# Patient Record
Sex: FEMALE | URBAN - METROPOLITAN AREA
[De-identification: names, ages, dates, MRNs, and addresses within clinical notes are randomized per-mention and may not be internally consistent; named-entity substitution may affect disease eponyms.]

---

## 2018-11-10 ENCOUNTER — INPATIENT (INPATIENT)
Facility: HOSPITAL | Age: 83
LOS: 0 days | DRG: 871 | End: 2018-11-11
Attending: INTERNAL MEDICINE | Admitting: INTERNAL MEDICINE
Payer: MEDICARE

## 2018-11-10 VITALS
DIASTOLIC BLOOD PRESSURE: 60 MMHG | TEMPERATURE: 95 F | HEIGHT: 63 IN | SYSTOLIC BLOOD PRESSURE: 151 MMHG | OXYGEN SATURATION: 100 % | HEART RATE: 68 BPM | WEIGHT: 85.1 LBS | RESPIRATION RATE: 16 BRPM

## 2018-11-10 DIAGNOSIS — Z90.11 ACQUIRED ABSENCE OF RIGHT BREAST AND NIPPLE: Chronic | ICD-10-CM

## 2018-11-10 DIAGNOSIS — I10 ESSENTIAL (PRIMARY) HYPERTENSION: ICD-10-CM

## 2018-11-10 DIAGNOSIS — G25.3 MYOCLONUS: ICD-10-CM

## 2018-11-10 DIAGNOSIS — Z29.9 ENCOUNTER FOR PROPHYLACTIC MEASURES, UNSPECIFIED: ICD-10-CM

## 2018-11-10 DIAGNOSIS — C50.919 MALIGNANT NEOPLASM OF UNSPECIFIED SITE OF UNSPECIFIED FEMALE BREAST: ICD-10-CM

## 2018-11-10 DIAGNOSIS — C90.00 MULTIPLE MYELOMA NOT HAVING ACHIEVED REMISSION: ICD-10-CM

## 2018-11-10 DIAGNOSIS — N17.9 ACUTE KIDNEY FAILURE, UNSPECIFIED: ICD-10-CM

## 2018-11-10 DIAGNOSIS — A41.9 SEPSIS, UNSPECIFIED ORGANISM: ICD-10-CM

## 2018-11-10 DIAGNOSIS — I46.9 CARDIAC ARREST, CAUSE UNSPECIFIED: ICD-10-CM

## 2018-11-10 DIAGNOSIS — K72.00 ACUTE AND SUBACUTE HEPATIC FAILURE WITHOUT COMA: ICD-10-CM

## 2018-11-10 DIAGNOSIS — D64.9 ANEMIA, UNSPECIFIED: ICD-10-CM

## 2018-11-10 LAB
ABO RH CONFIRMATION: SIGNIFICANT CHANGE UP
ALBUMIN SERPL ELPH-MCNC: 1.4 G/DL — LOW (ref 3.5–5)
ALBUMIN SERPL ELPH-MCNC: 1.5 G/DL — LOW (ref 3.5–5)
ALBUMIN SERPL ELPH-MCNC: 1.6 G/DL — LOW (ref 3.5–5)
ALP SERPL-CCNC: 107 U/L — SIGNIFICANT CHANGE UP (ref 40–120)
ALP SERPL-CCNC: 109 U/L — SIGNIFICANT CHANGE UP (ref 40–120)
ALP SERPL-CCNC: 99 U/L — SIGNIFICANT CHANGE UP (ref 40–120)
ALT FLD-CCNC: 1445 U/L DA — HIGH (ref 10–60)
ALT FLD-CCNC: 446 U/L DA — HIGH (ref 10–60)
ALT FLD-CCNC: 999 U/L DA — HIGH (ref 10–60)
ANION GAP SERPL CALC-SCNC: 21 MMOL/L — HIGH (ref 5–17)
ANION GAP SERPL CALC-SCNC: 23 MMOL/L — HIGH (ref 5–17)
ANION GAP SERPL CALC-SCNC: 25 MMOL/L — HIGH (ref 5–17)
APPEARANCE UR: CLEAR — SIGNIFICANT CHANGE UP
APTT BLD: 56 SEC — HIGH (ref 27.5–36.3)
AST SERPL-CCNC: 2806 U/L — HIGH (ref 10–40)
AST SERPL-CCNC: 725 U/L — HIGH (ref 10–40)
AST SERPL-CCNC: >2002 U/L — HIGH (ref 10–40)
BASE EXCESS BLDA CALC-SCNC: -12.7 MMOL/L — LOW (ref -2–2)
BASE EXCESS BLDA CALC-SCNC: -8.5 MMOL/L — LOW (ref -2–2)
BASE EXCESS BLDV CALC-SCNC: -9.8 MMOL/L — LOW (ref -2–2)
BILIRUB SERPL-MCNC: 0.7 MG/DL — SIGNIFICANT CHANGE UP (ref 0.2–1.2)
BILIRUB SERPL-MCNC: 1.3 MG/DL — HIGH (ref 0.2–1.2)
BILIRUB SERPL-MCNC: 1.4 MG/DL — HIGH (ref 0.2–1.2)
BILIRUB UR-MCNC: NEGATIVE — SIGNIFICANT CHANGE UP
BLOOD GAS COMMENTS ARTERIAL: SIGNIFICANT CHANGE UP
BLOOD GAS COMMENTS ARTERIAL: SIGNIFICANT CHANGE UP
BUN SERPL-MCNC: 43 MG/DL — HIGH (ref 7–18)
BUN SERPL-MCNC: 44 MG/DL — HIGH (ref 7–18)
BUN SERPL-MCNC: 45 MG/DL — HIGH (ref 7–18)
CALCIUM SERPL-MCNC: 6.4 MG/DL — CRITICAL LOW (ref 8.4–10.5)
CALCIUM SERPL-MCNC: 6.7 MG/DL — LOW (ref 8.4–10.5)
CALCIUM SERPL-MCNC: 7.1 MG/DL — LOW (ref 8.4–10.5)
CHLORIDE SERPL-SCNC: 102 MMOL/L — SIGNIFICANT CHANGE UP (ref 96–108)
CHLORIDE SERPL-SCNC: 102 MMOL/L — SIGNIFICANT CHANGE UP (ref 96–108)
CHLORIDE SERPL-SCNC: 99 MMOL/L — SIGNIFICANT CHANGE UP (ref 96–108)
CHLORIDE UR-SCNC: 104 MMOL/L — SIGNIFICANT CHANGE UP (ref 55–125)
CHOLEST SERPL-MCNC: 82 MG/DL — SIGNIFICANT CHANGE UP (ref 10–199)
CK MB BLD-MCNC: 0.7 % — SIGNIFICANT CHANGE UP (ref 0–3.5)
CK MB CFR SERPL CALC: 16.1 NG/ML — HIGH (ref 0–3.6)
CK MB CFR SERPL CALC: 2.1 NG/ML — SIGNIFICANT CHANGE UP (ref 0–3.6)
CK MB CFR SERPL CALC: 25 NG/ML — HIGH (ref 0–3.6)
CK SERPL-CCNC: 2268 U/L — SIGNIFICANT CHANGE UP (ref 21–215)
CK SERPL-CCNC: 3449 U/L — HIGH (ref 21–215)
CO2 SERPL-SCNC: 12 MMOL/L — LOW (ref 22–31)
CO2 SERPL-SCNC: 12 MMOL/L — LOW (ref 22–31)
CO2 SERPL-SCNC: 16 MMOL/L — LOW (ref 22–31)
COLOR SPEC: YELLOW — SIGNIFICANT CHANGE UP
CREAT ?TM UR-MCNC: <13 MG/DL — SIGNIFICANT CHANGE UP
CREAT SERPL-MCNC: 1.86 MG/DL — HIGH (ref 0.5–1.3)
CREAT SERPL-MCNC: 1.92 MG/DL — HIGH (ref 0.5–1.3)
CREAT SERPL-MCNC: 2.09 MG/DL — HIGH (ref 0.5–1.3)
DIFF PNL FLD: ABNORMAL
GLUCOSE SERPL-MCNC: 204 MG/DL — HIGH (ref 70–99)
GLUCOSE SERPL-MCNC: 225 MG/DL — HIGH (ref 70–99)
GLUCOSE SERPL-MCNC: 315 MG/DL — HIGH (ref 70–99)
GLUCOSE UR QL: NEGATIVE — SIGNIFICANT CHANGE UP
HBA1C BLD-MCNC: 4.8 % — SIGNIFICANT CHANGE UP (ref 4–5.6)
HCO3 BLDA-SCNC: 12 MMOL/L — LOW (ref 23–27)
HCO3 BLDA-SCNC: 15 MMOL/L — LOW (ref 23–27)
HCO3 BLDV-SCNC: 16 MMOL/L — LOW (ref 21–29)
HCT VFR BLD CALC: 20.6 % — CRITICAL LOW (ref 34.5–45)
HCT VFR BLD CALC: 28.8 % — LOW (ref 34.5–45)
HCT VFR BLD CALC: 33 % — LOW (ref 34.5–45)
HDLC SERPL-MCNC: 38 MG/DL — LOW
HGB BLD-MCNC: 10.1 G/DL — LOW (ref 11.5–15.5)
HGB BLD-MCNC: 11.7 G/DL — SIGNIFICANT CHANGE UP (ref 11.5–15.5)
HGB BLD-MCNC: 6.2 G/DL — CRITICAL LOW (ref 11.5–15.5)
HOROWITZ INDEX BLDA+IHG-RTO: 100 — SIGNIFICANT CHANGE UP
HOROWITZ INDEX BLDA+IHG-RTO: 40 — SIGNIFICANT CHANGE UP
HOROWITZ INDEX BLDV+IHG-RTO: 40 — SIGNIFICANT CHANGE UP
INR BLD: 2.24 RATIO — HIGH (ref 0.88–1.16)
KETONES UR-MCNC: NEGATIVE — SIGNIFICANT CHANGE UP
LACTATE SERPL-SCNC: 12.5 MMOL/L — CRITICAL HIGH (ref 0.7–2)
LACTATE SERPL-SCNC: 13.7 MMOL/L — CRITICAL HIGH (ref 0.7–2)
LACTATE SERPL-SCNC: 14.5 MMOL/L — CRITICAL HIGH (ref 0.7–2)
LACTATE SERPL-SCNC: 14.8 MMOL/L — CRITICAL HIGH (ref 0.7–2)
LACTATE SERPL-SCNC: 16.3 MMOL/L — CRITICAL HIGH (ref 0.7–2)
LEUKOCYTE ESTERASE UR-ACNC: ABNORMAL
LIDOCAIN IGE QN: 89 U/L — SIGNIFICANT CHANGE UP (ref 73–393)
LIPID PNL WITH DIRECT LDL SERPL: 36 MG/DL — SIGNIFICANT CHANGE UP
LYMPHOCYTES # BLD AUTO: 67 % — HIGH (ref 13–44)
MCHC RBC-ENTMCNC: 30 GM/DL — LOW (ref 32–36)
MCHC RBC-ENTMCNC: 30.5 PG — SIGNIFICANT CHANGE UP (ref 27–34)
MCHC RBC-ENTMCNC: 31 PG — SIGNIFICANT CHANGE UP (ref 27–34)
MCHC RBC-ENTMCNC: 31 PG — SIGNIFICANT CHANGE UP (ref 27–34)
MCHC RBC-ENTMCNC: 35.1 GM/DL — SIGNIFICANT CHANGE UP (ref 32–36)
MCHC RBC-ENTMCNC: 35.3 GM/DL — SIGNIFICANT CHANGE UP (ref 32–36)
MCV RBC AUTO: 103.1 FL — HIGH (ref 80–100)
MCV RBC AUTO: 87 FL — SIGNIFICANT CHANGE UP (ref 80–100)
MCV RBC AUTO: 87.7 FL — SIGNIFICANT CHANGE UP (ref 80–100)
MONOCYTES NFR BLD AUTO: 1 % — LOW (ref 2–14)
NEUTROPHILS NFR BLD AUTO: 19 % — LOW (ref 43–77)
NITRITE UR-MCNC: NEGATIVE — SIGNIFICANT CHANGE UP
NT-PROBNP SERPL-SCNC: 9532 PG/ML — HIGH (ref 0–450)
PCO2 BLDA: 24 MMHG — LOW (ref 32–46)
PCO2 BLDA: 26 MMHG — LOW (ref 32–46)
PCO2 BLDV: 36 MMHG — SIGNIFICANT CHANGE UP (ref 35–50)
PH BLDA: 7.32 — LOW (ref 7.35–7.45)
PH BLDA: 7.39 — SIGNIFICANT CHANGE UP (ref 7.35–7.45)
PH BLDV: 7.27 — LOW (ref 7.35–7.45)
PH UR: 6.5 — SIGNIFICANT CHANGE UP (ref 5–8)
PLATELET # BLD AUTO: 104 K/UL — LOW (ref 150–400)
PLATELET # BLD AUTO: 63 K/UL — LOW (ref 150–400)
PLATELET # BLD AUTO: 96 K/UL — LOW (ref 150–400)
PO2 BLDA: 516 MMHG — HIGH (ref 74–108)
PO2 BLDA: 81 MMHG — SIGNIFICANT CHANGE UP (ref 74–108)
PO2 BLDV: SIGNIFICANT CHANGE UP MMHG (ref 25–45)
POTASSIUM SERPL-MCNC: 3.4 MMOL/L — LOW (ref 3.5–5.3)
POTASSIUM SERPL-MCNC: 4 MMOL/L — SIGNIFICANT CHANGE UP (ref 3.5–5.3)
POTASSIUM SERPL-MCNC: 5.6 MMOL/L — HIGH (ref 3.5–5.3)
POTASSIUM SERPL-SCNC: 3.4 MMOL/L — LOW (ref 3.5–5.3)
POTASSIUM SERPL-SCNC: 4 MMOL/L — SIGNIFICANT CHANGE UP (ref 3.5–5.3)
POTASSIUM SERPL-SCNC: 5.6 MMOL/L — HIGH (ref 3.5–5.3)
PROT ?TM UR-MCNC: 69 MG/DL — HIGH (ref 0–12)
PROT SERPL-MCNC: 5.1 G/DL — LOW (ref 6–8.3)
PROT SERPL-MCNC: 5.3 G/DL — LOW (ref 6–8.3)
PROT SERPL-MCNC: 5.6 G/DL — LOW (ref 6–8.3)
PROT UR-MCNC: 30 MG/DL
PROTHROM AB SERPL-ACNC: 25.5 SEC — HIGH (ref 10–12.9)
RBC # BLD: 2 M/UL — LOW (ref 3.8–5.2)
RBC # BLD: 3.31 M/UL — LOW (ref 3.8–5.2)
RBC # BLD: 3.76 M/UL — LOW (ref 3.8–5.2)
RBC # FLD: 16.4 % — HIGH (ref 10.3–14.5)
RBC # FLD: 17.3 % — HIGH (ref 10.3–14.5)
RBC # FLD: 17.4 % — HIGH (ref 10.3–14.5)
SAO2 % BLDA: 95 % — SIGNIFICANT CHANGE UP (ref 92–96)
SAO2 % BLDA: SIGNIFICANT CHANGE UP % (ref 92–96)
SAO2 % BLDV: 60 % — LOW (ref 67–88)
SODIUM SERPL-SCNC: 134 MMOL/L — LOW (ref 135–145)
SODIUM SERPL-SCNC: 139 MMOL/L — SIGNIFICANT CHANGE UP (ref 135–145)
SODIUM SERPL-SCNC: 139 MMOL/L — SIGNIFICANT CHANGE UP (ref 135–145)
SODIUM UR-SCNC: 110 MMOL/L — SIGNIFICANT CHANGE UP (ref 40–220)
SP GR SPEC: 1.01 — SIGNIFICANT CHANGE UP (ref 1.01–1.02)
TOTAL CHOLESTEROL/HDL RATIO MEASUREMENT: 2.2 RATIO — LOW (ref 3.3–7.1)
TRIGL SERPL-MCNC: 39 MG/DL — SIGNIFICANT CHANGE UP (ref 10–149)
TROPONIN I SERPL-MCNC: 0.14 NG/ML — HIGH (ref 0–0.04)
TROPONIN I SERPL-MCNC: 0.93 NG/ML — HIGH (ref 0–0.04)
TROPONIN I SERPL-MCNC: 2.86 NG/ML — HIGH (ref 0–0.04)
TSH SERPL-MCNC: 7 UU/ML — HIGH (ref 0.34–4.82)
UROBILINOGEN FLD QL: NEGATIVE — SIGNIFICANT CHANGE UP
WBC # BLD: 0.5 K/UL — CRITICAL LOW (ref 3.8–10.5)
WBC # BLD: 0.6 K/UL — CRITICAL LOW (ref 3.8–10.5)
WBC # BLD: 3 K/UL — LOW (ref 3.8–10.5)
WBC # FLD AUTO: 0.5 K/UL — CRITICAL LOW (ref 3.8–10.5)
WBC # FLD AUTO: 0.6 K/UL — CRITICAL LOW (ref 3.8–10.5)
WBC # FLD AUTO: 3 K/UL — LOW (ref 3.8–10.5)

## 2018-11-10 PROCEDURE — 71045 X-RAY EXAM CHEST 1 VIEW: CPT | Mod: 26,77

## 2018-11-10 PROCEDURE — 99291 CRITICAL CARE FIRST HOUR: CPT

## 2018-11-10 PROCEDURE — 70450 CT HEAD/BRAIN W/O DYE: CPT | Mod: 26

## 2018-11-10 PROCEDURE — 93970 EXTREMITY STUDY: CPT | Mod: 26

## 2018-11-10 PROCEDURE — 95819 EEG AWAKE AND ASLEEP: CPT | Mod: 26

## 2018-11-10 PROCEDURE — 74018 RADEX ABDOMEN 1 VIEW: CPT | Mod: 26,76

## 2018-11-10 RX ORDER — ATROPINE SULFATE 0.1 MG/ML
0.5 SYRINGE (ML) INJECTION ONCE
Qty: 0 | Refills: 0 | Status: COMPLETED | OUTPATIENT
Start: 2018-11-10 | End: 2018-11-10

## 2018-11-10 RX ORDER — ACETYLCYSTEINE 200 MG/ML
3.9 VIAL (ML) MISCELLANEOUS ONCE
Qty: 0 | Refills: 0 | Status: COMPLETED | OUTPATIENT
Start: 2018-11-10 | End: 2018-11-10

## 2018-11-10 RX ORDER — ATORVASTATIN CALCIUM 80 MG/1
40 TABLET, FILM COATED ORAL AT BEDTIME
Qty: 0 | Refills: 0 | Status: DISCONTINUED | OUTPATIENT
Start: 2018-11-10 | End: 2018-11-11

## 2018-11-10 RX ORDER — PANTOPRAZOLE SODIUM 20 MG/1
80 TABLET, DELAYED RELEASE ORAL ONCE
Qty: 0 | Refills: 0 | Status: DISCONTINUED | OUTPATIENT
Start: 2018-11-10 | End: 2018-11-10

## 2018-11-10 RX ORDER — FAMOTIDINE 10 MG/ML
20 INJECTION INTRAVENOUS
Qty: 0 | Refills: 0 | Status: DISCONTINUED | OUTPATIENT
Start: 2018-11-10 | End: 2018-11-10

## 2018-11-10 RX ORDER — ACETYLCYSTEINE 200 MG/ML
1.9 VIAL (ML) MISCELLANEOUS ONCE
Qty: 0 | Refills: 0 | Status: COMPLETED | OUTPATIENT
Start: 2018-11-10 | End: 2018-11-10

## 2018-11-10 RX ORDER — CALCIUM GLUCONATE 100 MG/ML
1 VIAL (ML) INTRAVENOUS ONCE
Qty: 0 | Refills: 0 | Status: COMPLETED | OUTPATIENT
Start: 2018-11-10 | End: 2018-11-10

## 2018-11-10 RX ORDER — PROPOFOL 10 MG/ML
5 INJECTION, EMULSION INTRAVENOUS
Qty: 1000 | Refills: 0 | Status: DISCONTINUED | OUTPATIENT
Start: 2018-11-10 | End: 2018-11-10

## 2018-11-10 RX ORDER — SODIUM CHLORIDE 9 MG/ML
1000 INJECTION, SOLUTION INTRAVENOUS
Qty: 0 | Refills: 0 | Status: DISCONTINUED | OUTPATIENT
Start: 2018-11-10 | End: 2018-11-11

## 2018-11-10 RX ORDER — INSULIN LISPRO 100/ML
VIAL (ML) SUBCUTANEOUS EVERY 6 HOURS
Qty: 0 | Refills: 0 | Status: DISCONTINUED | OUTPATIENT
Start: 2018-11-10 | End: 2018-11-11

## 2018-11-10 RX ORDER — PANTOPRAZOLE SODIUM 20 MG/1
80 TABLET, DELAYED RELEASE ORAL ONCE
Qty: 0 | Refills: 0 | Status: COMPLETED | OUTPATIENT
Start: 2018-11-10 | End: 2018-11-10

## 2018-11-10 RX ORDER — ASPIRIN/CALCIUM CARB/MAGNESIUM 324 MG
300 TABLET ORAL DAILY
Qty: 0 | Refills: 0 | Status: DISCONTINUED | OUTPATIENT
Start: 2018-11-10 | End: 2018-11-11

## 2018-11-10 RX ORDER — SODIUM CHLORIDE 9 MG/ML
500 INJECTION INTRAMUSCULAR; INTRAVENOUS; SUBCUTANEOUS ONCE
Qty: 0 | Refills: 0 | Status: COMPLETED | OUTPATIENT
Start: 2018-11-10 | End: 2018-11-10

## 2018-11-10 RX ORDER — VANCOMYCIN HCL 1 G
750 VIAL (EA) INTRAVENOUS EVERY 24 HOURS
Qty: 0 | Refills: 0 | Status: DISCONTINUED | OUTPATIENT
Start: 2018-11-10 | End: 2018-11-11

## 2018-11-10 RX ORDER — DEXTROSE 50 % IN WATER 50 %
15 SYRINGE (ML) INTRAVENOUS ONCE
Qty: 0 | Refills: 0 | Status: DISCONTINUED | OUTPATIENT
Start: 2018-11-10 | End: 2018-11-10

## 2018-11-10 RX ORDER — SODIUM CHLORIDE 9 MG/ML
2000 INJECTION INTRAMUSCULAR; INTRAVENOUS; SUBCUTANEOUS ONCE
Qty: 0 | Refills: 0 | Status: COMPLETED | OUTPATIENT
Start: 2018-11-10 | End: 2018-11-10

## 2018-11-10 RX ORDER — VANCOMYCIN HCL 1 G
1000 VIAL (EA) INTRAVENOUS ONCE
Qty: 0 | Refills: 0 | Status: COMPLETED | OUTPATIENT
Start: 2018-11-10 | End: 2018-11-10

## 2018-11-10 RX ORDER — SODIUM CHLORIDE 9 MG/ML
1000 INJECTION, SOLUTION INTRAVENOUS
Qty: 0 | Refills: 0 | Status: DISCONTINUED | OUTPATIENT
Start: 2018-11-10 | End: 2018-11-10

## 2018-11-10 RX ORDER — CHLORHEXIDINE GLUCONATE 213 G/1000ML
1 SOLUTION TOPICAL DAILY
Qty: 0 | Refills: 0 | Status: DISCONTINUED | OUTPATIENT
Start: 2018-11-10 | End: 2018-11-10

## 2018-11-10 RX ORDER — ACETYLCYSTEINE 200 MG/ML
6 VIAL (ML) MISCELLANEOUS ONCE
Qty: 0 | Refills: 0 | Status: COMPLETED | OUTPATIENT
Start: 2018-11-10 | End: 2018-11-10

## 2018-11-10 RX ORDER — FILGRASTIM 480MCG/1.6
300 VIAL (ML) INJECTION DAILY
Qty: 0 | Refills: 0 | Status: DISCONTINUED | OUTPATIENT
Start: 2018-11-10 | End: 2018-11-11

## 2018-11-10 RX ORDER — NOREPINEPHRINE BITARTRATE/D5W 8 MG/250ML
0.05 PLASTIC BAG, INJECTION (ML) INTRAVENOUS
Qty: 16 | Refills: 0 | Status: DISCONTINUED | OUTPATIENT
Start: 2018-11-10 | End: 2018-11-11

## 2018-11-10 RX ORDER — SODIUM CHLORIDE 9 MG/ML
1000 INJECTION INTRAMUSCULAR; INTRAVENOUS; SUBCUTANEOUS
Qty: 0 | Refills: 0 | Status: DISCONTINUED | OUTPATIENT
Start: 2018-11-10 | End: 2018-11-10

## 2018-11-10 RX ORDER — DEXTROSE 50 % IN WATER 50 %
12.5 SYRINGE (ML) INTRAVENOUS ONCE
Qty: 0 | Refills: 0 | Status: DISCONTINUED | OUTPATIENT
Start: 2018-11-10 | End: 2018-11-10

## 2018-11-10 RX ORDER — SODIUM CHLORIDE 9 MG/ML
1000 INJECTION INTRAMUSCULAR; INTRAVENOUS; SUBCUTANEOUS ONCE
Qty: 0 | Refills: 0 | Status: COMPLETED | OUTPATIENT
Start: 2018-11-10 | End: 2018-11-10

## 2018-11-10 RX ORDER — PANTOPRAZOLE SODIUM 20 MG/1
40 TABLET, DELAYED RELEASE ORAL
Qty: 0 | Refills: 0 | Status: DISCONTINUED | OUTPATIENT
Start: 2018-11-10 | End: 2018-11-10

## 2018-11-10 RX ORDER — PANTOPRAZOLE SODIUM 20 MG/1
40 TABLET, DELAYED RELEASE ORAL
Qty: 0 | Refills: 0 | Status: DISCONTINUED | OUTPATIENT
Start: 2018-11-10 | End: 2018-11-11

## 2018-11-10 RX ORDER — DEXTROSE 50 % IN WATER 50 %
25 SYRINGE (ML) INTRAVENOUS ONCE
Qty: 0 | Refills: 0 | Status: DISCONTINUED | OUTPATIENT
Start: 2018-11-10 | End: 2018-11-10

## 2018-11-10 RX ORDER — PIPERACILLIN AND TAZOBACTAM 4; .5 G/20ML; G/20ML
3.38 INJECTION, POWDER, LYOPHILIZED, FOR SOLUTION INTRAVENOUS EVERY 12 HOURS
Qty: 0 | Refills: 0 | Status: DISCONTINUED | OUTPATIENT
Start: 2018-11-10 | End: 2018-11-11

## 2018-11-10 RX ORDER — LEVETIRACETAM 250 MG/1
1000 TABLET, FILM COATED ORAL EVERY 12 HOURS
Qty: 0 | Refills: 0 | Status: DISCONTINUED | OUTPATIENT
Start: 2018-11-10 | End: 2018-11-11

## 2018-11-10 RX ORDER — SODIUM BICARBONATE 1 MEQ/ML
150 SYRINGE (ML) INTRAVENOUS ONCE
Qty: 0 | Refills: 0 | Status: COMPLETED | OUTPATIENT
Start: 2018-11-10 | End: 2018-11-10

## 2018-11-10 RX ORDER — DEXTROSE 50 % IN WATER 50 %
25 SYRINGE (ML) INTRAVENOUS ONCE
Qty: 0 | Refills: 0 | Status: COMPLETED | OUTPATIENT
Start: 2018-11-10 | End: 2018-11-10

## 2018-11-10 RX ORDER — GLUCAGON INJECTION, SOLUTION 0.5 MG/.1ML
1 INJECTION, SOLUTION SUBCUTANEOUS ONCE
Qty: 0 | Refills: 0 | Status: DISCONTINUED | OUTPATIENT
Start: 2018-11-10 | End: 2018-11-11

## 2018-11-10 RX ORDER — DEXTROSE 50 % IN WATER 50 %
12.5 SYRINGE (ML) INTRAVENOUS ONCE
Qty: 0 | Refills: 0 | Status: COMPLETED | OUTPATIENT
Start: 2018-11-10 | End: 2018-11-10

## 2018-11-10 RX ORDER — CHLORHEXIDINE GLUCONATE 213 G/1000ML
1 SOLUTION TOPICAL
Qty: 0 | Refills: 0 | Status: DISCONTINUED | OUTPATIENT
Start: 2018-11-10 | End: 2018-11-11

## 2018-11-10 RX ORDER — PIPERACILLIN AND TAZOBACTAM 4; .5 G/20ML; G/20ML
3.38 INJECTION, POWDER, LYOPHILIZED, FOR SOLUTION INTRAVENOUS ONCE
Qty: 0 | Refills: 0 | Status: COMPLETED | OUTPATIENT
Start: 2018-11-10 | End: 2018-11-10

## 2018-11-10 RX ADMIN — Medication 63.72 GRAM(S): at 20:41

## 2018-11-10 RX ADMIN — Medication 150 MILLIEQUIVALENT(S): at 08:53

## 2018-11-10 RX ADMIN — Medication 200 GRAM(S): at 08:53

## 2018-11-10 RX ADMIN — Medication 0.5 MILLIGRAM(S): at 20:07

## 2018-11-10 RX ADMIN — Medication 250 MILLIGRAM(S): at 20:41

## 2018-11-10 RX ADMIN — PIPERACILLIN AND TAZOBACTAM 25 GRAM(S): 4; .5 INJECTION, POWDER, LYOPHILIZED, FOR SOLUTION INTRAVENOUS at 23:46

## 2018-11-10 RX ADMIN — Medication 12.5 GRAM(S): at 13:40

## 2018-11-10 RX ADMIN — Medication 1.81 MICROGRAM(S)/KG/MIN: at 16:07

## 2018-11-10 RX ADMIN — PANTOPRAZOLE SODIUM 80 MILLIGRAM(S): 20 TABLET, DELAYED RELEASE ORAL at 13:25

## 2018-11-10 RX ADMIN — SODIUM CHLORIDE 2000 MILLILITER(S): 9 INJECTION INTRAMUSCULAR; INTRAVENOUS; SUBCUTANEOUS at 08:15

## 2018-11-10 RX ADMIN — SODIUM CHLORIDE 2000 MILLILITER(S): 9 INJECTION INTRAMUSCULAR; INTRAVENOUS; SUBCUTANEOUS at 07:38

## 2018-11-10 RX ADMIN — SODIUM CHLORIDE 100 MILLILITER(S): 9 INJECTION, SOLUTION INTRAVENOUS at 13:43

## 2018-11-10 RX ADMIN — PIPERACILLIN AND TAZOBACTAM 200 GRAM(S): 4; .5 INJECTION, POWDER, LYOPHILIZED, FOR SOLUTION INTRAVENOUS at 13:38

## 2018-11-10 RX ADMIN — SODIUM CHLORIDE 75 MILLILITER(S): 9 INJECTION, SOLUTION INTRAVENOUS at 16:08

## 2018-11-10 RX ADMIN — LEVETIRACETAM 400 MILLIGRAM(S): 250 TABLET, FILM COATED ORAL at 20:49

## 2018-11-10 RX ADMIN — Medication 300 MICROGRAM(S): at 18:43

## 2018-11-10 RX ADMIN — SODIUM CHLORIDE 75 MILLILITER(S): 9 INJECTION INTRAMUSCULAR; INTRAVENOUS; SUBCUTANEOUS at 10:34

## 2018-11-10 RX ADMIN — Medication 200 GRAM(S): at 17:39

## 2018-11-10 RX ADMIN — SODIUM CHLORIDE 500 MILLILITER(S): 9 INJECTION INTRAMUSCULAR; INTRAVENOUS; SUBCUTANEOUS at 21:32

## 2018-11-10 RX ADMIN — Medication 300 MILLIGRAM(S): at 17:41

## 2018-11-10 RX ADMIN — Medication 127.38 GRAM(S): at 16:07

## 2018-11-10 RX ADMIN — LEVETIRACETAM 400 MILLIGRAM(S): 250 TABLET, FILM COATED ORAL at 14:00

## 2018-11-10 RX ADMIN — CHLORHEXIDINE GLUCONATE 1 APPLICATION(S): 213 SOLUTION TOPICAL at 18:43

## 2018-11-10 RX ADMIN — Medication 3: at 17:40

## 2018-11-10 RX ADMIN — Medication 250 MILLIGRAM(S): at 10:30

## 2018-11-10 RX ADMIN — Medication 280 GRAM(S): at 15:00

## 2018-11-10 RX ADMIN — Medication 1 APPLICATION(S): at 20:49

## 2018-11-10 RX ADMIN — Medication 25 GRAM(S): at 13:41

## 2018-11-10 RX ADMIN — SODIUM CHLORIDE 1000 MILLILITER(S): 9 INJECTION INTRAMUSCULAR; INTRAVENOUS; SUBCUTANEOUS at 08:54

## 2018-11-10 NOTE — H&P ADULT - PROBLEM SELECTOR PLAN 5
baseline LFTS unknown  shock liver  trend CMP with fluids  f/u hepatitis panel  trend lactate and hydrate

## 2018-11-10 NOTE — H&P ADULT - HISTORY OF PRESENT ILLNESS
PULMONARY/CRITICAL CARE CONSULTATION  JYOTI ADAMS 89y FemalePatient is a 89y old  Female who presents with a chief complaint of     HPI:     PAST MEDICAL & SURGICAL HISTORY:  Hypertension  Breast cancer  Multiple myeloma  H/O right mastectomy    Allergies    No Known Allergies    Intolerances      SOCIAL HISTORY/FAMILY HISTORY reviewed:   Medications:  aspirin Suppository 300 milliGRAM(s) Rectal daily  atorvastatin 40 milliGRAM(s) Oral at bedtime  atropine Injectable 0.5 milliGRAM(s) IV Push once PRN  calcium gluconate IVPB 1 Gram(s) IV Intermittent Once  piperacillin/tazobactam IVPB. 3.375 Gram(s) IV Intermittent once  sodium bicarbonate  Injectable 150 milliEquivalent(s) IV Push Once  sodium chloride 0.9% Bolus 1000 milliLiter(s) IV Bolus once  sodium chloride 0.9%. 1000 milliLiter(s) IV Continuous <Continuous>  vancomycin  IVPB 1000 milliGRAM(s) IV Intermittent once      Review of Systems: unobtainable 2/2 mental status, and intubation    vent settings if applicable:  Mode: AC/ CMV (Assist Control/ Continuous Mandatory Ventilation)  RR (machine): 16  TV (machine): 450  FiO2: 100  PEEP: 5  MAP: 10  PIP: 26      T(F): 94.8 (11-10-18 @ 06:54), Max: 94.8 (11-10-18 @ 06:54)  HR: 64 (11-10-18 @ 07:54)  BP: 117/41 (11-10-18 @ 07:54)  BP(mean): --  ABP: --  RR: 15 (11-10-18 @ 07:54)  SpO2: 100% (11-10-18 @ 07:54)      LABS:                        6.2    3.0   )-----------( x        ( 10 Nov 2018 07:30 )             20.6     11-10    134<L>  |  99  |  44<H>  ----------------------------<  204<H>  5.6<H>   |  12<L>  |  2.09<H>    Ca    7.1<L>      10 Nov 2018 07:30    TPro  5.6<L>  /  Alb  1.5<L>  /  TBili  0.7  /  DBili  x   /  AST  725<H>  /  ALT  446<H>  /  AlkPhos  107  11-10    CARDIAC MARKERS ( 10 Nov 2018 07:30 )  0.139 ng/mL / x     / x     / x     / 2.1 ng/mL    POCT Blood Glucose.: 73 mg/dL (10 Nov 2018 06:53)    PT/INR - ( 10 Nov 2018 07:30 )   PT: 25.5 sec;   INR: 2.24 ratio      PTT - ( 10 Nov 2018 07:30 )  PTT:56.0 sec  Urinalysis Basic - ( 10 Nov 2018 07:30 )    Color: Yellow / Appearance: Clear / S.010 / pH: x  Gluc: x / Ketone: Negative  / Bili: Negative / Urobili: Negative   Blood: x / Protein: 30 mg/dL / Nitrite: Negative   Leuk Esterase: Small / RBC: 5-10 /HPF / WBC 3-5 /HPF   Sq Epi: x / Non Sq Epi: Few /HPF / Bacteria: Moderate /HPF    PHYSICAL EXAM:  GENERAL:  [x ]well-groomed, thin, generalized weeping and ecchymotic lesions  HEAD:  [ x]Atraumatic, [x ]Normocephalic;  EYES: R eye dilated, L eye pinpoint  ENMT: [x ]No tonsillar erythema, exudates, or enlargement; [x ]Moist mucous membranes  NECK: [x ]Supple, normal appearance, [x ]No JVD; [x ]Normal thyroid; [x ]Trachea midline;  NERVOUS SYSTEM:  spontaneously moves extremities, unarousable to painful stimuli  CHEST/LUNG: CTAB  HEART: [ ]irregular rate and rhythm; [x ]No murmurs, rubs, or gallops;  ABDOMEN: [ x]Soft, Nontender, Nondistended; [x ]Bowel sounds present;  EXTREMITIES:  [x ]2+ Peripheral Pulses, [x ]No clubbing, cyanosis, or edema  SKIN: diffuse weeping and ecchymotic lesions    RADIOLOGY REVIEWED: PULMONARY/CRITICAL CARE CONSULTATION  JYOTI ADAMS 89y FemalePatient is a 89y old  Female who presents with a chief complaint of     HPI:     Patient is a 89F from home, AAOx3 at baseline, ambulates with walker, with PMH HTN, chronic anemia (gets monthly blood transfusions at [facility] with Dr. Hutson- was scheduled for this week, last transfusion Oct 16th), multiple myeloma, Hx breast CA 1 year ago Dx with R mastectomy (now in remission), presenting s/p cardiac arrest at home (time of onset unknown). As per  at bedside, patient was reporting not feeling well last night and at 3am was found on the floor on the 1st floor of the house and was moved to a sofa at that time.  later heard patient groaning at 6am, went to check on the patient, who was unresponsive and then called EMS. EMS gave 4 rounds of CPR with 4 epi, 1 amp d50, 1 am bicarb) and patient was intubated. As per , patient was recently at [facility] 2 weeks ago for chest pain and was discharged with musculoskeletal pain, with cardiac work up negative. Denies hx CHF or cardiac disease.     In the ED, patient was bradycardic to the 30s and was given atropine x1. Hypothermic to Temp 94, not on TTM due to unwitnessed cardiac arrest. Meets sepsis criteria, s/p 3L bolus, 1 dose vanc and zosyn ordered. Lactate 16.     Spoke to patient's daughter, Jackie Francis, who is also HCP with patient's , for further history. She would like more time for goals of care decision and wants full code at this time, as she is [place] from [place] of [place].  at bedside agreeable to blood transfusion and central line placement if needed, but would like to defer goals of care to patient's daughter.      PAST MEDICAL & SURGICAL HISTORY:  Hypertension  Breast cancer  Multiple myeloma  H/O right mastectomy    Allergies    No Known Allergies    Intolerances      SOCIAL HISTORY/FAMILY HISTORY reviewed:   Medications:  aspirin Suppository 300 milliGRAM(s) Rectal daily  atorvastatin 40 milliGRAM(s) Oral at bedtime  atropine Injectable 0.5 milliGRAM(s) IV Push once PRN  calcium gluconate IVPB 1 Gram(s) IV Intermittent Once  piperacillin/tazobactam IVPB. 3.375 Gram(s) IV Intermittent once  sodium bicarbonate  Injectable 150 milliEquivalent(s) IV Push Once  sodium chloride 0.9% Bolus 1000 milliLiter(s) IV Bolus once  sodium chloride 0.9%. 1000 milliLiter(s) IV Continuous <Continuous>  vancomycin  IVPB 1000 milliGRAM(s) IV Intermittent once      Review of Systems: unobtainable 2/2 mental status, and intubation    vent settings if applicable:  Mode: AC/ CMV (Assist Control/ Continuous Mandatory Ventilation)  RR (machine): 16  TV (machine): 450  FiO2: 100  PEEP: 5  MAP: 10  PIP: 26      T(F): 94.8 (11-10-18 @ 06:54), Max: 94.8 (11-10-18 @ 06:54)  HR: 64 (11-10-18 @ 07:54)  BP: 117/41 (11-10-18 @ 07:54)  BP(mean): --  ABP: --  RR: 15 (11-10-18 @ 07:54)  SpO2: 100% (11-10-18 @ 07:54)      LABS:                        6.2    3.0   )-----------( x        ( 10 Nov 2018 07:30 )             20.6     11-10    134<L>  |  99  |  44<H>  ----------------------------<  204<H>  5.6<H>   |  12<L>  |  2.09<H>    Ca    7.1<L>      10 Nov 2018 07:30    TPro  5.6<L>  /  Alb  1.5<L>  /  TBili  0.7  /  DBili  x   /  AST  725<H>  /  ALT  446<H>  /  AlkPhos  107  11-10    CARDIAC MARKERS ( 10 Nov 2018 07:30 )  0.139 ng/mL / x     / x     / x     / 2.1 ng/mL    POCT Blood Glucose.: 73 mg/dL (10 Nov 2018 06:53)    PT/INR - ( 10 Nov 2018 07:30 )   PT: 25.5 sec;   INR: 2.24 ratio      PTT - ( 10 Nov 2018 07:30 )  PTT:56.0 sec  Urinalysis Basic - ( 10 Nov 2018 07:30 )    Color: Yellow / Appearance: Clear / S.010 / pH: x  Gluc: x / Ketone: Negative  / Bili: Negative / Urobili: Negative   Blood: x / Protein: 30 mg/dL / Nitrite: Negative   Leuk Esterase: Small / RBC: 5-10 /HPF / WBC 3-5 /HPF   Sq Epi: x / Non Sq Epi: Few /HPF / Bacteria: Moderate /HPF    PHYSICAL EXAM:  GENERAL:  [x ]well-groomed, thin, generalized weeping and ecchymotic lesions  HEAD:  [ x]Atraumatic, [x ]Normocephalic;  EYES: R eye dilated, L eye pinpoint  ENMT: [x ]No tonsillar erythema, exudates, or enlargement; [x ]Moist mucous membranes  NECK: [x ]Supple, normal appearance, [x ]No JVD; [x ]Normal thyroid; [x ]Trachea midline;  NERVOUS SYSTEM:  spontaneously moves extremities, unarousable to painful stimuli  CHEST/LUNG: CTAB  HEART: [ ]irregular rate and rhythm; [x ]No murmurs, rubs, or gallops;  ABDOMEN: [ x]Soft, Nontender, Nondistended; [x ]Bowel sounds present;  EXTREMITIES:  [x ]2+ Peripheral Pulses, [x ]No clubbing, cyanosis, or edema  SKIN: diffuse weeping and ecchymotic lesions    RADIOLOGY REVIEWED:    EKG: ST depressions V4-V6, afib PULMONARY/CRITICAL CARE CONSULTATION  JYOTI ADAMS 89y FemalePatient is a 89y old  Female who presents with a chief complaint of s/p cardiac arrest.    HPI:     Patient is a 89F from home, AAOx3 at baseline, ambulates with walker, with PMH HTN, chronic anemia (gets monthly blood transfusions at [facility] with Dr. Hutson- was scheduled for this week, last transfusion Oct 16th), multiple myeloma, Hx breast CA 1 year ago Dx with R mastectomy (now in remission), presenting s/p cardiac arrest at home (time of onset unknown). As per  at bedside, patient was reporting not feeling well last night and at 3am was found on the floor on the 1st floor of the house and was moved to a sofa at that time.  later heard patient groaning at 6am, went to check on the patient, who was unresponsive and then called EMS. EMS gave 4 rounds of CPR with 4 epi, 1 amp d50, 1 am bicarb) and patient was intubated. As per , patient was recently at [facility] 2 weeks ago for chest pain and was discharged with musculoskeletal pain, with cardiac work up negative. Denies hx CHF or cardiac disease.     In the ED, patient was bradycardic to the 30s and was given atropine x1. Hypothermic to Temp 94, not on TTM due to unwitnessed cardiac arrest. Meets sepsis criteria, s/p 3L bolus, 1 dose vanc and zosyn ordered. Lactate 16.     Spoke to patient's daughter, Jackie Francis, who is also HCP with patient's , for further history. She would like more time for goals of care decision and wants full code at this time, as she is flying in from out of state.  at bedside agreeable to blood transfusion and central line placement if needed, but would like to defer goals of care to patient's daughter.      PAST MEDICAL & SURGICAL HISTORY:  Hypertension  Breast cancer  Multiple myeloma  H/O right mastectomy    Allergies    No Known Allergies    Intolerances      SOCIAL HISTORY/FAMILY HISTORY reviewed:   Medications:  aspirin Suppository 300 milliGRAM(s) Rectal daily  atorvastatin 40 milliGRAM(s) Oral at bedtime  atropine Injectable 0.5 milliGRAM(s) IV Push once PRN  calcium gluconate IVPB 1 Gram(s) IV Intermittent Once  piperacillin/tazobactam IVPB. 3.375 Gram(s) IV Intermittent once  sodium bicarbonate  Injectable 150 milliEquivalent(s) IV Push Once  sodium chloride 0.9% Bolus 1000 milliLiter(s) IV Bolus once  sodium chloride 0.9%. 1000 milliLiter(s) IV Continuous <Continuous>  vancomycin  IVPB 1000 milliGRAM(s) IV Intermittent once      Review of Systems: unobtainable 2/2 mental status, and intubation    vent settings if applicable:  Mode: AC/ CMV (Assist Control/ Continuous Mandatory Ventilation)  RR (machine): 16  TV (machine): 450  FiO2: 100  PEEP: 5  MAP: 10  PIP: 26      T(F): 94.8 (11-10-18 @ 06:54), Max: 94.8 (11-10-18 @ 06:54)  HR: 64 (11-10-18 @ 07:54)  BP: 117/41 (11-10-18 @ 07:54)  BP(mean): --  ABP: --  RR: 15 (11-10-18 @ 07:54)  SpO2: 100% (11-10-18 @ 07:54)      LABS:                        6.2    3.0   )-----------( x        ( 10 Nov 2018 07:30 )             20.6     11-10    134<L>  |  99  |  44<H>  ----------------------------<  204<H>  5.6<H>   |  12<L>  |  2.09<H>    Ca    7.1<L>      10 Nov 2018 07:30    TPro  5.6<L>  /  Alb  1.5<L>  /  TBili  0.7  /  DBili  x   /  AST  725<H>  /  ALT  446<H>  /  AlkPhos  107  11-10    CARDIAC MARKERS ( 10 Nov 2018 07:30 )  0.139 ng/mL / x     / x     / x     / 2.1 ng/mL    POCT Blood Glucose.: 73 mg/dL (10 Nov 2018 06:53)    PT/INR - ( 10 Nov 2018 07:30 )   PT: 25.5 sec;   INR: 2.24 ratio      PTT - ( 10 Nov 2018 07:30 )  PTT:56.0 sec  Urinalysis Basic - ( 10 Nov 2018 07:30 )    Color: Yellow / Appearance: Clear / S.010 / pH: x  Gluc: x / Ketone: Negative  / Bili: Negative / Urobili: Negative   Blood: x / Protein: 30 mg/dL / Nitrite: Negative   Leuk Esterase: Small / RBC: 5-10 /HPF / WBC 3-5 /HPF   Sq Epi: x / Non Sq Epi: Few /HPF / Bacteria: Moderate /HPF    PHYSICAL EXAM:  GENERAL:  [x ]well-groomed, thin, generalized weeping and ecchymotic lesions  HEAD:  [ x]Atraumatic, [x ]Normocephalic;  EYES: R eye dilated, L eye pinpoint  ENMT: [x ]No tonsillar erythema, exudates, or enlargement; [x ]Moist mucous membranes  NECK: [x ]Supple, normal appearance, [x ]No JVD; [x ]Normal thyroid; [x ]Trachea midline;  NERVOUS SYSTEM:  spontaneously moves extremities, unarousable to painful stimuli  CHEST/LUNG: CTAB  HEART: [ ]irregular rate and rhythm; [x ]No murmurs, rubs, or gallops;  ABDOMEN: [ x]Soft, Nontender, Nondistended; [x ]Bowel sounds present;  EXTREMITIES:  [x ]2+ Peripheral Pulses, [x ]No clubbing, cyanosis, or edema  SKIN: diffuse weeping and ecchymotic lesions    RADIOLOGY REVIEWED:    EKG: ST depressions V4-V6, afib PULMONARY/CRITICAL CARE CONSULTATION  JYOTI ADAMS 89y FemalePatient is a 89y old  Female who presents with a chief complaint of s/p cardiac arrest.    HPI:   Patient is a 89F from home, AAOx3 at baseline, ambulates with walker, with PMH HTN, chronic anemia (gets monthly blood transfusions at [facility] with Dr. Hutson- was scheduled for this week, last transfusion Oct 16th), multiple myeloma, Hx breast CA 1 year ago Dx with R mastectomy (now in remission), presenting s/p cardiac arrest at home (time of onset unknown). As per  at bedside, patient was reporting not feeling well last night and at 3am was found on the floor on the 1st floor of the house and was moved to a sofa at that time.  later heard patient groaning at 6am, went to check on the patient, who was unresponsive and then called EMS. EMS gave 4 rounds of CPR with 4 epi, 1 amp d50, 1 am bicarb) and patient was intubated. As per , patient was recently at [facility] 2 weeks ago for chest pain and was discharged with musculoskeletal pain, with cardiac work up negative. Denies hx CHF or cardiac disease.     In the ED, patient was bradycardic to the 30s and was given atropine x1. Hypothermic to Temp 94, not on TTM due to unwitnessed cardiac arrest. Meets sepsis criteria, s/p 3L bolus, 1 dose vanc and zosyn ordered. Lactate 16.     Spoke to patient's daughter, Jackie Francis, who is also HCP with patient's , for further history. She would like more time for goals of care decision and wants full code at this time, as she is flying in from out of state.  at bedside agreeable to blood transfusion and central line placement if needed, but would like to defer goals of care to patient's daughter.      PAST MEDICAL & SURGICAL HISTORY:  Hypertension  Breast cancer  Multiple myeloma  H/O right mastectomy    FAMILY HISTORY:  No pertinent family history in first degree relatives    Allergies  No Known Allergies    SOCIAL HISTORY/FAMILY HISTORY reviewed:   Medications:  aspirin Suppository 300 milliGRAM(s) Rectal daily  atorvastatin 40 milliGRAM(s) Oral at bedtime  atropine Injectable 0.5 milliGRAM(s) IV Push once PRN  calcium gluconate IVPB 1 Gram(s) IV Intermittent Once  piperacillin/tazobactam IVPB. 3.375 Gram(s) IV Intermittent once  sodium bicarbonate  Injectable 150 milliEquivalent(s) IV Push Once  sodium chloride 0.9% Bolus 1000 milliLiter(s) IV Bolus once  sodium chloride 0.9%. 1000 milliLiter(s) IV Continuous <Continuous>  vancomycin  IVPB 1000 milliGRAM(s) IV Intermittent once      Review of Systems: unobtainable 2/2 mental status, and intubation    vent settings if applicable:  Mode: AC/ CMV (Assist Control/ Continuous Mandatory Ventilation)  RR (machine): 16  TV (machine): 450  FiO2: 100  PEEP: 5  MAP: 10  PIP: 26      T(F): 94.8 (11-10-18 @ 06:54), Max: 94.8 (11-10-18 @ 06:54)  HR: 64 (11-10-18 @ 07:54)  BP: 117/41 (11-10-18 @ 07:54)  BP(mean): --  ABP: --  RR: 15 (11-10-18 @ 07:54)  SpO2: 100% (11-10-18 @ 07:54)      LABS:                        6.2    3.0   )-----------( x        ( 10 Nov 2018 07:30 )             20.6     11-10    134<L>  |  99  |  44<H>  ----------------------------<  204<H>  5.6<H>   |  12<L>  |  2.09<H>    Ca    7.1<L>      10 Nov 2018 07:30    TPro  5.6<L>  /  Alb  1.5<L>  /  TBili  0.7  /  DBili  x   /  AST  725<H>  /  ALT  446<H>  /  AlkPhos  107  11-10    CARDIAC MARKERS ( 10 Nov 2018 07:30 )  0.139 ng/mL / x     / x     / x     / 2.1 ng/mL    POCT Blood Glucose.: 73 mg/dL (10 Nov 2018 06:53)    PT/INR - ( 10 Nov 2018 07:30 )   PT: 25.5 sec;   INR: 2.24 ratio      PTT - ( 10 Nov 2018 07:30 )  PTT:56.0 sec  Urinalysis Basic - ( 10 Nov 2018 07:30 )    Color: Yellow / Appearance: Clear / S.010 / pH: x  Gluc: x / Ketone: Negative  / Bili: Negative / Urobili: Negative   Blood: x / Protein: 30 mg/dL / Nitrite: Negative   Leuk Esterase: Small / RBC: 5-10 /HPF / WBC 3-5 /HPF   Sq Epi: x / Non Sq Epi: Few /HPF / Bacteria: Moderate /HPF    PHYSICAL EXAM:  GENERAL:  [x ] thin, cachectic female, generalized weeping and ecchymotic lesions  HEAD:  [ x]Atraumatic, [x ]Normocephalic;  EYES: R eye dilated, L eye pinpoint  ENMT: [x ]No tonsillar erythema, exudates, or enlargement; [x ] Dry mucous membranes  NECK: [x ]Supple, normal appearance, [x ]No JVD; [x ]Normal thyroid; [x ]Trachea midline;  NERVOUS SYSTEM:  spontaneously moves extremities, unarousable to painful stimuli  CHEST/LUNG: CTAB  HEART: [ ]irregular rate and rhythm; [x ]No murmurs, rubs, or gallops;  ABDOMEN: [ x]Soft, Nontender, Nondistended; [x ]Bowel sounds present;  EXTREMITIES:  [x ]2+ Peripheral Pulses, [x ]No clubbing, cyanosis, or edema  SKIN: diffuse weeping and ecchymotic lesions    RADIOLOGY REVIEWED:    EKG: ST depressions V4-V6, afib

## 2018-11-10 NOTE — EEG REPORT - NS EEG TEXT BOX
[facility]   COMPREHENSIVE EPILEPSY CENTER   REPORT OF ROUTINE VIDEO EEG     [facility]: [address], [place] [address], Ph#: [phone]  [facility]: [address], [place] [address], Ph#: [phone]  Office: [address] [place] [address], [facility] [address], [place] [address] Ph#: [phone]    Patient Name: JYOTI ADAMS  Age and : 89y ([birth year])  MRN #: [id number]  Location: [place] [address]  Referring Physician: Elder Cevallos    Study Date: 11-10-18    _____________________________________________________________  TECHNICAL INFORMATION    Placement and Labeling of Electrodes:  The EEG was performed utilizing 20 channels referential EEG connections (coronal over temporal over parasagittal montage) using all standard 10-20 electrode placements with EKG.  Recording was at a sampling rate of 256 samples per second per channel.  Time synchronized digital video recording was done simultaneously with EEG recording.  A low light infrared camera was used for low light recording.  Joe and seizure detection algorithms were utilized.    _____________________________________________________________  HISTORY    Patient is a 89y old  Female who presents with a chief complaint of cardiac arrest (10 Nov 2018 08:44)      PERTINENT MEDICATION:  levETIRAcetam  IVPB 1000 milliGRAM(s) IV Intermittent every 12 hours    _____________________________________________________________  STUDY INTERPRETATION    Findings: The background was spontaneously variable. Background in burst suppression with one to three bursts of 1-2s diffuse theta/delta/alpha activities occurring one to two seconds apart, intermixed with 1-3m of diffuse suppression. No posterior dominant rhythm seen.    Focal Slowing:   None were present.    Sleep Background:  Stage II sleep transients were not recorded.    Other Non-Epileptiform Findings:  None were present.    Interictal Epileptiform Activity:     Events:  Clinical events: None recorded.  Seizures: None recorded.    Activation Procedures:   Hyperventilation was not performed.    Photic stimulation was performed and did not elicit any abnormality.     Artifacts:  Intermittent myogenic and movement artifacts were noted.    ECG:  The heart rate on single channel ECG was predominantly between 60-90 BPM.    _____________________________________________________________  EEG SUMMARY/CLASSIFICATION    Abnormal EEG in an unresponsive patient.  - Severe generalized slowing with burst suppression background.    _____________________________________________________________  EEG IMPRESSION/CLINICAL CORRELATE    Abnormal EEG study.  1. Severe nonspecific diffuse or multifocal cerebral dysfunction.   2. No epileptiform pattern or seizure seen.      Fernie Schroeder MD  Attending Physician, [facility]

## 2018-11-10 NOTE — CONSULT NOTE ADULT - SUBJECTIVE AND OBJECTIVE BOX
Patient is a 89y old  Female who presents with a chief complaint of cardiac arrest (10 Nov 2018 08:44)      HPI:  PULMONARY/CRITICAL CARE CONSULTATION  JYOTI ADAMS 89y FemalePatient is a 89y old  Female who presents with a chief complaint of s/p cardiac arrest.    HPI:   Patient is a 89F from home, AAOx3 at baseline, ambulates with walker, with PMH HTN, chronic anemia (gets monthly blood transfusions at [facility] with Dr. Hutson- was scheduled for this week, last transfusion Oct 16th), multiple myeloma, Hx breast CA 1 year ago Dx with R mastectomy (now in remission), presenting s/p cardiac arrest at home (time of onset unknown). As per  at bedside, patient was reporting not feeling well last night and at 3am was found on the floor on the 1st floor of the house and was moved to a sofa at that time.  later heard patient groaning at 6am, went to check on the patient, who was unresponsive and then called EMS. EMS gave 4 rounds of CPR with 4 epi, 1 amp d50, 1 am bicarb) and patient was intubated. As per , patient was recently at [facility] 2 weeks ago for chest pain and was discharged with musculoskeletal pain, with cardiac work up negative. Denies hx CHF or cardiac disease.     In the ED, patient was bradycardic to the 30s and was given atropine x1. Hypothermic to Temp 94, not on TTM due to unwitnessed cardiac arrest. Meets sepsis criteria, s/p 3L bolus, 1 dose vanc and zosyn ordered. Lactate 16.     Spoke to patient's daughter, Jackie JEANcristhian, who is also HCP with patient's , for further history. She would like more time for goals of care decision and wants full code at this time, as [place] is [place] from [place].  at bedside agreeable to blood transfusion and central line placement if needed, but would like to defer goals of care to patient's daughter.      PAST MEDICAL & SURGICAL HISTORY:  Hypertension  Breast cancer  Multiple myeloma  H/O right mastectomy    FAMILY HISTORY:  No pertinent family history in first degree relatives    Allergies  No Known Allergies    SOCIAL HISTORY/FAMILY HISTORY reviewed:   Medications:  aspirin Suppository 300 milliGRAM(s) Rectal daily  atorvastatin 40 milliGRAM(s) Oral at bedtime  atropine Injectable 0.5 milliGRAM(s) IV Push once PRN  calcium gluconate IVPB 1 Gram(s) IV Intermittent Once  piperacillin/tazobactam IVPB. 3.375 Gram(s) IV Intermittent once  sodium bicarbonate  Injectable 150 milliEquivalent(s) IV Push Once  sodium chloride 0.9% Bolus 1000 milliLiter(s) IV Bolus once  sodium chloride 0.9%. 1000 milliLiter(s) IV Continuous <Continuous>  vancomycin  IVPB 1000 milliGRAM(s) IV Intermittent once      Review of Systems: unobtainable 2/2 mental status, and intubation    vent settings if applicable:  Mode: AC/ CMV (Assist Control/ Continuous Mandatory Ventilation)  RR (machine): 16  TV (machine): 450  FiO2: 100  PEEP: 5  MAP: 10  PIP: 26      T(F): 94.8 (11-10-18 @ 06:54), Max: 94.8 (11-10-18 @ 06:54)  HR: 64 (11-10-18 @ 07:54)  BP: 117/41 (11-10-18 @ 07:54)  BP(mean): --  ABP: --  RR: 15 (11-10-18 @ 07:54)  SpO2: 100% (11-10-18 @ 07:54)      LABS:                        6.2    3.0   )-----------( x        ( 10 Nov 2018 07:30 )             20.6     -10    134<L>  |  99  |  44<H>  ----------------------------<  204<H>  5.6<H>   |  12<L>  |  2.09<H>    Ca    7.1<L>      10 Nov 2018 07:30    TPro  5.6<L>  /  Alb  1.5<L>  /  TBili  0.7  /  DBili  x   /  AST  725<H>  /  ALT  446<H>  /  AlkPhos  107  11-10    CARDIAC MARKERS ( 10 Nov 2018 07:30 )  0.139 ng/mL / x     / x     / x     / 2.1 ng/mL    POCT Blood Glucose.: 73 mg/dL (10 Nov 2018 06:53)    PT/INR - ( 10 Nov 2018 07:30 )   PT: 25.5 sec;   INR: 2.24 ratio      PTT - ( 10 Nov 2018 07:30 )  PTT:56.0 sec  Urinalysis Basic - ( 10 Nov 2018 07:30 )    Color: Yellow / Appearance: Clear / S.010 / pH: x  Gluc: x / Ketone: Negative  / Bili: Negative / Urobili: Negative   Blood: x / Protein: 30 mg/dL / Nitrite: Negative   Leuk Esterase: Small / RBC: 5-10 /HPF / WBC 3-5 /HPF   Sq Epi: x / Non Sq Epi: Few /HPF / Bacteria: Moderate /HPF    PHYSICAL EXAM:  GENERAL:  [x ] thin, cachectic female, generalized weeping and ecchymotic lesions  HEAD:  [ x]Atraumatic, [x ]Normocephalic;  EYES: R eye dilated, L eye pinpoint  ENMT: [x ]No tonsillar erythema, exudates, or enlargement; [x ] Dry mucous membranes  NECK: [x ]Supple, normal appearance, [x ]No JVD; [x ]Normal thyroid; [x ]Trachea midline;  NERVOUS SYSTEM:  spontaneously moves extremities, unarousable to painful stimuli  CHEST/LUNG: CTAB  HEART: [ ]irregular rate and rhythm; [x ]No murmurs, rubs, or gallops;  ABDOMEN: [ x]Soft, Nontender, Nondistended; [x ]Bowel sounds present;  EXTREMITIES:  [x ]2+ Peripheral Pulses, [x ]No clubbing, cyanosis, or edema  SKIN: diffuse weeping and ecchymotic lesions    RADIOLOGY REVIEWED:    EKG: ST depressions V4-V6, afib (10 Nov 2018 08:44)         Neurological Review of Systems:  No difficulty with language.  No vision loss or double vision.  No dizziness, vertigo or new hearing loss.  No difficulty with speech or swallowing.  No focal weakness.  No focal sensory changes.  No numbness or tingling in the bilateral lower extremities.  No difficulty with balance.  No difficulty with ambulation.        MEDICATIONS  (STANDING):  acetylcysteine IVPB 3.9 Gram(s) IV Intermittent once  aspirin Suppository 300 milliGRAM(s) Rectal daily  atorvastatin 40 milliGRAM(s) Oral at bedtime  calcium gluconate IVPB 1 Gram(s) IV Intermittent once  chlorhexidine 2% Cloths 1 Application(s) Topical daily  dextrose 5% + sodium chloride 0.9%. 1000 milliLiter(s) (75 mL/Hr) IV Continuous <Continuous>  dextrose 5%. 1000 milliLiter(s) (50 mL/Hr) IV Continuous <Continuous>  dextrose 50% Injectable 12.5 Gram(s) IV Push once  dextrose 50% Injectable 25 Gram(s) IV Push once  dextrose 50% Injectable 25 Gram(s) IV Push once  filgrastim-sndz Injectable 300 MICROGram(s) SubCutaneous daily  insulin lispro (HumaLOG) corrective regimen sliding scale   SubCutaneous every 6 hours  levETIRAcetam  IVPB 1000 milliGRAM(s) IV Intermittent every 12 hours  norepinephrine Infusion 0.05 MICROgram(s)/kG/Min (1.809 mL/Hr) IV Continuous <Continuous>  pantoprazole  Injectable 40 milliGRAM(s) IV Push two times a day  piperacillin/tazobactam IVPB. 3.375 Gram(s) IV Intermittent every 12 hours  vancomycin  IVPB 750 milliGRAM(s) IV Intermittent every 24 hours    MEDICATIONS  (PRN):  atropine Injectable 0.5 milliGRAM(s) IV Push once PRN bradycardia  dextrose 40% Gel 15 Gram(s) Oral once PRN Blood Glucose LESS THAN 70 milliGRAM(s)/deciliter  glucagon  Injectable 1 milliGRAM(s) IntraMuscular once PRN Glucose LESS THAN 70 milligrams/deciliter  LORazepam   Injectable 1 milliGRAM(s) IV Push every 4 hours PRN seizure    Allergies    No Known Allergies    Intolerances      PAST MEDICAL & SURGICAL HISTORY:  Hypertension  Breast cancer  Multiple myeloma  H/O right mastectomy    FAMILY HISTORY:  No pertinent family history in first degree relatives    SOCIAL HISTORY: non smoker/ former smoker/ active smoker    Review of Systems:  Constitutional: No generalized weakness. No fevers or chills.                    Eyes, Ears, Mouth, Throat: No vision loss   Respiratory: No shortness of breath or cough.                                Cardiovascular: No chest pain or palpitations  Gastrointestinal: No nausea or vomiting.                                         Genitourinary: No urinary incontinence or burning on urination.  Musculoskeletal: No joint pain.                                                           Dermatologic: No rash.  Neurological: as per HPI                                                                      Psychiatric: No behavioral problems.  Endocrine: No known hypoglycemia.               Hematologic/Lymphatic: No easy bleeding.    O:  Vital Signs Last 24 Hrs  T(C): 33.3 (10 Nov 2018 09:13), Max: 34.9 (10 Nov 2018 06:54)  T(F): 92 (10 Nov 2018 09:13), Max: 94.8 (10 Nov 2018 06:54)  HR: 69 (10 Nov 2018 16:00) (56 - 93)  BP: 88/43 (10 Nov 2018 16:00) (83/46 - 151/60)  BP(mean): 53 (10 Nov 2018 16:00) (49 - 103)  RR: 18 (10 Nov 2018 16:00) (15 - 34)  SpO2: 97% (10 Nov 2018 16:00) (93% - 100%)    General Exam:   General appearance: No acute distress                 Cardiovascular: Pedal dorsalis pulses intact bilaterally    Mental Status: Orientated to self, date and place.  Attention intact.  No dysarthria, aphasia or neglect.  Knowledge intact.  Registration intact.  Short and long term memory grossly intact.      Cranial Nerves: CN I - not tested.  PERRL, EOMI, VFF, no nystagmus or diplopia.  No APD.  Fundi not visualized.  CN V1-3 intact to light touch and pinprick.  No facial asymmetry.  Hearing intact to finger rub bilaterally.  Tongue, uvula and palate midline.  Sternocleidomastoid and Trapezius intact bilaterally.    Motor:   Tone: normal.                  Strength intact throughout  No pronator drift bilaterally                      No dysmetria on finger-nose-finger or heel-shin-heel  No truncal ataxia.  No resting, postural or action tremor.  No myoclonus.    Sensation: intact to light touch, pinprick, vibration and proprioception    Deep Tendon Reflexes: 1+ bilateral biceps, triceps, brachioradialis, knee and ankle  Toes flexor bilaterally    Gait: normal and stable.  Rhomberg -jacquelyn.    Other:     LABS:                        10.1   0.5   )-----------( 104      ( 10 Nov 2018 15:01 )             28.8     11-10    139  |  102  |  43<H>  ----------------------------<  315<H>  4.0   |  16<L>  |  1.92<H>    Ca    6.4<LL>      10 Nov 2018 15:01    TPro  5.1<L>  /  Alb  1.6<L>  /  TBili  1.3<H>  /  DBili  x   /  AST  >2002<H>  /  ALT  999<H>  /  AlkPhos  109  11-10    PT/INR - ( 10 Nov 2018 07:30 )   PT: 25.5 sec;   INR: 2.24 ratio         PTT - ( 10 Nov 2018 07:30 )  PTT:56.0 sec  Urinalysis Basic - ( 10 Nov 2018 07:30 )    Color: Yellow / Appearance: Clear / S.010 / pH: x  Gluc: x / Ketone: Negative  / Bili: Negative / Urobili: Negative   Blood: x / Protein: 30 mg/dL / Nitrite: Negative   Leuk Esterase: Small / RBC: 5-10 /HPF / WBC 3-5 /HPF   Sq Epi: x / Non Sq Epi: Few /HPF / Bacteria: Moderate /HPF          RADIOLOGY & ADDITIONAL STUDIES:    < from: CT Head No Cont (11.10.18 @ 09:06) > (images reviwed)    IMPRESSION: There is no evidence for recent intraparenchymal hemorrhage  or acute territorial type infarct. No extra-axial fluid collections   identified. Cortical/central atrophy. Chronic microvascular ischemic   change. The osseous structures appear markedly heterogeneous in   attenuation suggesting underlying metabolic bone disease; correlate   clinically.    < end of copied text >    EEG: Abnormal EEG study.  1. Severe nonspecific diffuse or multifocal cerebral dysfunction.   2. No epileptiform pattern or seizure seen. Patient is a 89y old  Female who presents with a chief complaint of cardiac arrest (10 Nov 2018 08:44)  Hx from chart    HPI:  PULMONARY/CRITICAL CARE CONSULTATION  JYOTI ADAMS 89y FemalePatient is a 89y old  Female who presents with a chief complaint of s/p cardiac arrest.    HPI:   Patient is a 89F from home, AAOx3 at baseline, ambulates with walker, with PMH HTN, chronic anemia (gets monthly blood transfusions at [facility] with Dr. Hutson- was scheduled for this week, last transfusion Oct 16th), multiple myeloma, Hx breast CA 1 year ago Dx with R mastectomy (now in remission), presenting s/p cardiac arrest at home (time of onset unknown), neurology called for myoclonus noted in hospital. As per , patient was reporting not feeling well last night and at 3am was found on the floor on the 1st floor of the house and was moved to a sofa at that time.  later heard patient groaning at 6am, went to check on the patient, who was unresponsive and then called EMS. EMS gave 4 rounds of CPR with 4 epi, 1 amp d50, 1 am bicarb) and patient was intubated. As per , patient was recently at [facility] 2 weeks ago for chest pain and was discharged with musculoskeletal pain, with cardiac work up negative. Denies hx CHF or cardiac disease.     In the ED, patient was bradycardic to the 30s and was given atropine x1. Hypothermic to Temp 94, not on TTM due to unwitnessed cardiac arrest. Meets sepsis criteria, s/p 3L bolus, 1 dose vanc and zosyn ordered. Lactate 16.     Spoke to patient's daughter, Jackie Francis, who is also HCP with patient's , for further history. She would like more time for goals of care decision and wants full code at this time, as [place] is [place] from [place] of [place].  at bedside agreeable to blood transfusion and central line placement if needed, but would like to defer goals of care to patient's daughter.    MEDICATIONS  (STANDING):  acetylcysteine IVPB 3.9 Gram(s) IV Intermittent once  aspirin Suppository 300 milliGRAM(s) Rectal daily  atorvastatin 40 milliGRAM(s) Oral at bedtime  calcium gluconate IVPB 1 Gram(s) IV Intermittent once  chlorhexidine 2% Cloths 1 Application(s) Topical daily  dextrose 5% + sodium chloride 0.9%. 1000 milliLiter(s) (75 mL/Hr) IV Continuous <Continuous>  dextrose 5%. 1000 milliLiter(s) (50 mL/Hr) IV Continuous <Continuous>  dextrose 50% Injectable 12.5 Gram(s) IV Push once  dextrose 50% Injectable 25 Gram(s) IV Push once  dextrose 50% Injectable 25 Gram(s) IV Push once  filgrastim-sndz Injectable 300 MICROGram(s) SubCutaneous daily  insulin lispro (HumaLOG) corrective regimen sliding scale   SubCutaneous every 6 hours  levETIRAcetam  IVPB 1000 milliGRAM(s) IV Intermittent every 12 hours  norepinephrine Infusion 0.05 MICROgram(s)/kG/Min (1.809 mL/Hr) IV Continuous <Continuous>  pantoprazole  Injectable 40 milliGRAM(s) IV Push two times a day  piperacillin/tazobactam IVPB. 3.375 Gram(s) IV Intermittent every 12 hours  vancomycin  IVPB 750 milliGRAM(s) IV Intermittent every 24 hours    MEDICATIONS  (PRN):  atropine Injectable 0.5 milliGRAM(s) IV Push once PRN bradycardia  dextrose 40% Gel 15 Gram(s) Oral once PRN Blood Glucose LESS THAN 70 milliGRAM(s)/deciliter  glucagon  Injectable 1 milliGRAM(s) IntraMuscular once PRN Glucose LESS THAN 70 milligrams/deciliter  LORazepam   Injectable 1 milliGRAM(s) IV Push every 4 hours PRN seizure    Allergies    No Known Allergies    Intolerances      PAST MEDICAL & SURGICAL HISTORY:  Hypertension  Breast cancer  Multiple myeloma  H/O right mastectomy    FAMILY HISTORY:  No pertinent family history in first degree relatives    SOCIAL HISTORY: unknown if smoker    Review of Systems:  Constitutional:  No fevers.                    Respiratory: No cough.                                 O:  Vital Signs Last 24 Hrs  T(C): 33.3 (10 Nov 2018 09:13), Max: 34.9 (10 Nov 2018 06:54)  T(F): 92 (10 Nov 2018 09:13), Max: 94.8 (10 Nov 2018 06:54)  HR: 69 (10 Nov 2018 16:00) (56 - 93)  BP: 88/43 (10 Nov 2018 16:00) (83/46 - 151/60)  BP(mean): 53 (10 Nov 2018 16:00) (49 - 103)  RR: 18 (10 Nov 2018 16:00) (15 - 34)  SpO2: 97% (10 Nov 2018 16:00) (93% - 100%)    General Exam:   General appearance: No acute distress                 Cardiovascular: Pedal dorsalis pulses intact bilaterally    Mental Status: Eyes open, no response to name or light touch.  Grimace to sternal rub.  Non verbal.  Does not follow requests.    Cranial Nerves: CN I - not tested.  PERRL, midline, no gaze deviation, does not blink bl temporal areas.  No facial asymmetry.     Motor:   Tone: normal.                  Strength: no passive or active movements or withdraw  No myoclonus.    Sensation: no response to pain in all 4 ext    Deep Tendon Reflexes: 1+ bilateral biceps, triceps, brachioradialis, knee   Toes mute bilaterally    Gait: unable to evalute    Other:     LABS:                        10.1   0.5   )-----------( 104      ( 10 Nov 2018 15:01 )             28.8     11-10    139  |  102  |  43<H>  ----------------------------<  315<H>  4.0   |  16<L>  |  1.92<H>    Ca    6.4<LL>      10 Nov 2018 15:01    TPro  5.1<L>  /  Alb  1.6<L>  /  TBili  1.3<H>  /  DBili  x   /  AST  >2002<H>  /  ALT  999<H>  /  AlkPhos  109  11-10    PT/INR - ( 10 Nov 2018 07:30 )   PT: 25.5 sec;   INR: 2.24 ratio         PTT - ( 10 Nov 2018 07:30 )  PTT:56.0 sec  Urinalysis Basic - ( 10 Nov 2018 07:30 )    Color: Yellow / Appearance: Clear / S.010 / pH: x  Gluc: x / Ketone: Negative  / Bili: Negative / Urobili: Negative   Blood: x / Protein: 30 mg/dL / Nitrite: Negative   Leuk Esterase: Small / RBC: 5-10 /HPF / WBC 3-5 /HPF   Sq Epi: x / Non Sq Epi: Few /HPF / Bacteria: Moderate /HPF          RADIOLOGY & ADDITIONAL STUDIES:    < from: CT Head No Cont (11.10.18 @ 09:06) > (images reviwed)    IMPRESSION: There is no evidence for recent intraparenchymal hemorrhage  or acute territorial type infarct. No extra-axial fluid collections   identified. Cortical/central atrophy. Chronic microvascular ischemic   change. The osseous structures appear markedly heterogeneous in   attenuation suggesting underlying metabolic bone disease; correlate   clinically.    < end of copied text >    EEG: Abnormal EEG study.  1. Severe nonspecific diffuse or multifocal cerebral dysfunction.   2. No epileptiform pattern or seizure seen.

## 2018-11-10 NOTE — ED PROVIDER NOTE - PROGRESS NOTE DETAILS
spoke with patient daughter antonio ([phone]) pts PMD dr neymar sheriff. pts oncologist dr roth signed out to dr perez for f/u ICU consult and placement of admission order perez: pt intubated and found to be anemic with lactate 16.  s/o from Dr keita to f/u icu eval.  icu accepted- admit to icu. stable not on pressor.

## 2018-11-10 NOTE — H&P ADULT - PROBLEM SELECTOR PLAN 2
meets sepsis criteria: low WBC, lactate 16, and temp 94.8 on admission  vanc and zosyn  UA neg, CXR neg  f/u bcx  lactate 16-- > 14 --> continue to trend q 2 hours  s/p 3L bolus, continue with maintenance

## 2018-11-10 NOTE — H&P ADULT - PROBLEM SELECTOR PLAN 4
Hx anemia, baseline Hb unknown  transfusing 2 PRBC and 1 plt  follow up repeat CBC  CBC q 8 hours- next 2pm and 8pm  sump tube for stomach distention, drained ~ 50cc dark brown vomitus likely 2/2 intubation  will place on protonix 40mg IV BID

## 2018-11-10 NOTE — ED PROVIDER NOTE - CARE PLAN
Principal Discharge DX:	Cardiac arrest Principal Discharge DX:	Cardiac arrest  Secondary Diagnosis:	Anemia

## 2018-11-10 NOTE — ED ADULT NURSE NOTE - NSIMPLEMENTINTERV_GEN_ALL_ED
Implemented All Fall with Harm Risk Interventions:  [place] to call system. Call bell, personal items and telephone within reach. Instruct patient to call for assistance. Room bathroom lighting operational. Non-slip footwear when patient is off stretcher. Physically safe environment: no spills, clutter or unnecessary equipment. Stretcher in lowest position, wheels locked, appropriate side rails in place. Provide visual cue, wrist band, yellow gown, etc. Monitor gait and stability. Monitor for mental status changes and reorient to person, place, and time. Review medications for side effects contributing to fall risk. Reinforce activity limits and safety measures with patient and family. Provide visual clues: red socks.

## 2018-11-10 NOTE — ED ADULT NURSE NOTE - OBJECTIVE STATEMENT
biba as notification for cardiac arrest, on arrival intubated ET 7.5 sinus bradycardic,   as per  patient was seen not responding after 5 am this morning and he called the , noted with bruise and echymosis all  over the  4 extremities with multiple skin tear

## 2018-11-10 NOTE — H&P ADULT - ATTENDING COMMENTS
Patient seen and examined with resident, Addendum to above.    89 y/o female with history of multiple myeloma, HTN, Breast cancer admitted post cardiac arrest. Unwitnessed arrest, unknown downtime. Noted to be hypothermic, pancytopenic in the ED. Not initiated on TTM in the ED. Patient on evaluation with evidence of jerky movements, possibly suggestive of seizures.     Assessment:  1. Cardiac arrest  2. Acute respiratory failure   3. Acute kidney failure - r/o ATN due to hypoperfusion  4. Transaminitis - likely shock liver  5. Pancytopenia - history of multiple myeloma   6. Lactic acidosis     - Admit to ICU   - Check cardiac markers  - Check Echo  - Cardiology consult  - Cont. Ventilatory support  - Benzos for seizures  - Load with Keppra  - Neurology consult   - Check EEG  - Transfuse PRBC to hgb >7  - Repeat Labs  - trend Lactate level   - Empiric antibiotics  - F/u culture results   - Urine electrolytes and UA  - US of the kidney and bladder  - Monitor urine output  - Prognosis is guarded  - GOC discussion with family  - GI/DVT prophylaxis Patient seen and examined with resident, Addendum to above.    89 y/o female with history of multiple myeloma, HTN, Breast cancer admitted post cardiac arrest. Unwitnessed arrest, unknown downtime. Noted to be hypothermic, pancytopenic in the ED. Not initiated on TTM in the ED. Patient on evaluation with evidence of jerky movements, possibly suggestive of seizures.     Assessment:  1. Cardiac arrest  2. Acute respiratory failure   3. Acute kidney failure - r/o ATN due to hypoperfusion  4. Transaminitis - likely shock liver  5. Pancytopenia - history of multiple myeloma   6. Lactic acidosis     - Admit to ICU   - Check cardiac markers  - Check Echo  - Cardiology consult  - Cont. Ventilatory support  - Benzos for seizures  - Load with Keppra  - Neurology consult   - Check EEG  - Transfuse PRBC to hgb >7  - Repeat Labs  - trend Lactate level   - Empiric antibiotics  - F/u culture results   - Urine electrolytes and UA  - US of the kidney and bladder  - Monitor urine output  - Initiate NAC  - Prognosis is guarded  - GOC discussion with family  - GI/DVT prophylaxis

## 2018-11-10 NOTE — PROCEDURE NOTE - PROCEDURE
<<-----Click on this checkbox to enter Procedure Central venous catheter insertion  11/10/2018    Active  [id number]

## 2018-11-10 NOTE — H&P ADULT - PROBLEM SELECTOR PLAN 3
hx MM Dx April 2018  follows up at [facility] - Dr. Haresh Hutson consulted  transfuse for Hb <7  gets monthly blood transfusions- last Oct 2018  f/u b/l LE dopplers

## 2018-11-10 NOTE — ED PROVIDER NOTE - CRITICAL CARE PROVIDED
telephone consultation with the patient's family/interpretation of diagnostic studies/direct patient care (not related to procedure)/documentation/consultation with other physicians/consult w/ pt's family directly relating to pts condition/conducted a detailed discussion of DNR status/additional history taking

## 2018-11-10 NOTE — ED PROVIDER NOTE - OBJECTIVE STATEMENT
89 year old female PMH HTN, anemia (gets transfusions monthly, multiple myeloma BIBA s/p cardiac arrest. as per  patient was found on ground at 3am, moved into a chair at that time. At 6am  heard odd noises and checked on her and she was unresponsive and EMS called. 4 rounds ACLS, glucose, bicarb given with ROSC. pt was admited 2 weeks ago to [facility] and was discharged with diagnosis of muscle pain after 5 days.

## 2018-11-10 NOTE — H&P ADULT - PROBLEM SELECTOR PLAN 6
baseline Cr unknown  Cr 2.09 on admission  f/u urine lytes  mata for monitoring output  abdominal sono  likely pre-renal from dehydration

## 2018-11-10 NOTE — ED ADULT TRIAGE NOTE - CHIEF COMPLAINT QUOTE
BIBA notification s/p cardiac arrest, inubated 7.0 e-tube ,was given 4 amps of epinephrine,1 amp d50,1 amp sodium bicarb by ems

## 2018-11-10 NOTE — H&P ADULT - PROBLEM SELECTOR PLAN 1
s/p cardiac arrest at home ~ ROSC in 20 min to afib with slow VR  as per  and daughter, had neg ischemic workup at [facility] 2 weeks ago  s/p 4 epi, 1 bicarb, 1 calcium gluconate in EMS  in the ED, went bradycardic to 30s and had atropine 0.5mg x 1 with improvement of HR  EKG: afib with ST depressions in V4-v6  rectal aspirin, lipitor  holding beta blocker 2/2 bradycardia in ED  echocardiogram  not a candidate for TTM as unwitnessed arrest, hypothermic in ED to 96 deg F  hemodynamically stable off pressors  Dr. Fermin- cardio  trop 1: 0.139 --> continue to trend  likely 2/2 demand ischemia and anemia

## 2018-11-10 NOTE — H&P ADULT - PROBLEM SELECTOR PLAN 7
holding anti-hypertensives due to borderline BP  consent for central line in chart, not need for pressors at this time  monitor BP

## 2018-11-10 NOTE — H&P ADULT - PROBLEM SELECTOR PLAN 9
IMPROVE VTE Individual Risk Assessment          RISK                                                          Points  [  ] Previous VTE                                                3  [  ] Thrombophilia                                             2  [  ] Lower limb paralysis                                   2        (unable to hold up >15 seconds)    [  x] Current Cancer                                             2         (within 6 months)  [  x] Immobilization > 24 hrs                              1  [ x ] ICU/CCU stay > 24 hours                             1  [  x] Age > 60                                                         1    IMPROVE VTE Score: 5, SCDs for now in setting of anemia  protonix 40mg IV BID for gi ppx

## 2018-11-10 NOTE — ED PROVIDER NOTE - MEDICAL DECISION MAKING DETAILS
In ed on arrival bradycardic in 30-40, BP WNL. given atropine .5mg with improvement of HR. given 2L NS. cxr with b/l infiltrates, ett proper placement. labs significant for anemia. 2 units prbc ordered. ICU consulted. will admit to ICU

## 2018-11-10 NOTE — PROCEDURE NOTE - NSPOSTPRCRAD_GEN_A_CORE
central line located in the/no pneumothorax/central line located in the superior vena cava/post-procedure radiography performed

## 2018-11-11 VITALS — RESPIRATION RATE: 16 BRPM

## 2018-11-11 DIAGNOSIS — Z71.89 OTHER SPECIFIED COUNSELING: ICD-10-CM

## 2018-11-11 DIAGNOSIS — G93.1 ANOXIC BRAIN DAMAGE, NOT ELSEWHERE CLASSIFIED: ICD-10-CM

## 2018-11-11 DIAGNOSIS — K72.90 HEPATIC FAILURE, UNSPECIFIED WITHOUT COMA: ICD-10-CM

## 2018-11-11 LAB
24R-OH-CALCIDIOL SERPL-MCNC: 37.1 NG/ML — SIGNIFICANT CHANGE UP (ref 30–80)
ALBUMIN SERPL ELPH-MCNC: 0.7 G/DL — LOW (ref 3.5–5)
ALBUMIN SERPL ELPH-MCNC: 0.8 G/DL — LOW (ref 3.5–5)
ALP SERPL-CCNC: 62 U/L — SIGNIFICANT CHANGE UP (ref 40–120)
ALP SERPL-CCNC: 72 U/L — SIGNIFICANT CHANGE UP (ref 40–120)
ALT FLD-CCNC: 2595 U/L DA — HIGH (ref 10–60)
ALT FLD-CCNC: 2902 U/L DA — HIGH (ref 10–60)
ANION GAP SERPL CALC-SCNC: 30 MMOL/L — HIGH (ref 5–17)
ANION GAP SERPL CALC-SCNC: 33 MMOL/L — HIGH (ref 5–17)
AST SERPL-CCNC: 5334 U/L — HIGH (ref 10–40)
AST SERPL-CCNC: 6502 U/L — SIGNIFICANT CHANGE UP (ref 10–40)
BASE EXCESS BLDA CALC-SCNC: SIGNIFICANT CHANGE UP MMOL/L (ref -2–2)
BASOPHILS # BLD AUTO: 0.1 K/UL — SIGNIFICANT CHANGE UP (ref 0–0.2)
BASOPHILS NFR BLD AUTO: 4.2 % — HIGH (ref 0–2)
BILIRUB SERPL-MCNC: 1 MG/DL — SIGNIFICANT CHANGE UP (ref 0.2–1.2)
BILIRUB SERPL-MCNC: 1 MG/DL — SIGNIFICANT CHANGE UP (ref 0.2–1.2)
BLOOD GAS COMMENTS ARTERIAL: SIGNIFICANT CHANGE UP
BUN SERPL-MCNC: 37 MG/DL — HIGH (ref 7–18)
BUN SERPL-MCNC: 37 MG/DL — HIGH (ref 7–18)
CALCIUM SERPL-MCNC: 7.5 MG/DL — LOW (ref 8.4–10.5)
CALCIUM SERPL-MCNC: 7.6 MG/DL — LOW (ref 8.4–10.5)
CHLORIDE SERPL-SCNC: 102 MMOL/L — SIGNIFICANT CHANGE UP (ref 96–108)
CHLORIDE SERPL-SCNC: 105 MMOL/L — SIGNIFICANT CHANGE UP (ref 96–108)
CO2 SERPL-SCNC: 4 MMOL/L — CRITICAL LOW (ref 22–31)
CO2 SERPL-SCNC: 8 MMOL/L — CRITICAL LOW (ref 22–31)
CREAT SERPL-MCNC: 1.96 MG/DL — HIGH (ref 0.5–1.3)
CREAT SERPL-MCNC: 2.08 MG/DL — HIGH (ref 0.5–1.3)
EOSINOPHIL # BLD AUTO: 0 K/UL — SIGNIFICANT CHANGE UP (ref 0–0.5)
EOSINOPHIL NFR BLD AUTO: 0.3 % — SIGNIFICANT CHANGE UP (ref 0–6)
FOLATE SERPL-MCNC: >20 NG/ML — SIGNIFICANT CHANGE UP
GLUCOSE SERPL-MCNC: 347 MG/DL — HIGH (ref 70–99)
GLUCOSE SERPL-MCNC: 453 MG/DL — CRITICAL HIGH (ref 70–99)
HAV IGM SER-ACNC: SIGNIFICANT CHANGE UP
HBV CORE IGM SER-ACNC: SIGNIFICANT CHANGE UP
HBV SURFACE AG SER-ACNC: SIGNIFICANT CHANGE UP
HCO3 BLDA-SCNC: SIGNIFICANT CHANGE UP MMOL/L (ref 23–27)
HCT VFR BLD CALC: 20.3 % — CRITICAL LOW (ref 34.5–45)
HCT VFR BLD CALC: 21.3 % — LOW (ref 34.5–45)
HCT VFR BLD CALC: 21.5 % — LOW (ref 34.5–45)
HCV AB S/CO SERPL IA: 0.04 S/CO — SIGNIFICANT CHANGE UP
HCV AB SERPL-IMP: SIGNIFICANT CHANGE UP
HGB BLD-MCNC: 6.4 G/DL — CRITICAL LOW (ref 11.5–15.5)
HGB BLD-MCNC: 6.4 G/DL — CRITICAL LOW (ref 11.5–15.5)
HGB BLD-MCNC: 6.6 G/DL — CRITICAL LOW (ref 11.5–15.5)
HOROWITZ INDEX BLDA+IHG-RTO: 40 — SIGNIFICANT CHANGE UP
LACTATE SERPL-SCNC: 17.5 MMOL/L — CRITICAL HIGH (ref 0.7–2)
LYMPHOCYTES # BLD AUTO: 0.7 K/UL — LOW (ref 1–3.3)
LYMPHOCYTES # BLD AUTO: 45.1 % — HIGH (ref 13–44)
MAGNESIUM SERPL-MCNC: 2.4 MG/DL — SIGNIFICANT CHANGE UP (ref 1.6–2.6)
MCHC RBC-ENTMCNC: 29 PG — SIGNIFICANT CHANGE UP (ref 27–34)
MCHC RBC-ENTMCNC: 29.6 PG — SIGNIFICANT CHANGE UP (ref 27–34)
MCHC RBC-ENTMCNC: 29.9 GM/DL — LOW (ref 32–36)
MCHC RBC-ENTMCNC: 30.1 PG — SIGNIFICANT CHANGE UP (ref 27–34)
MCHC RBC-ENTMCNC: 30.9 GM/DL — LOW (ref 32–36)
MCHC RBC-ENTMCNC: 31.6 GM/DL — LOW (ref 32–36)
MCV RBC AUTO: 95.2 FL — SIGNIFICANT CHANGE UP (ref 80–100)
MCV RBC AUTO: 95.7 FL — SIGNIFICANT CHANGE UP (ref 80–100)
MCV RBC AUTO: 96.8 FL — SIGNIFICANT CHANGE UP (ref 80–100)
MONOCYTES # BLD AUTO: 0.2 K/UL — SIGNIFICANT CHANGE UP (ref 0–0.9)
MONOCYTES NFR BLD AUTO: 11.4 % — SIGNIFICANT CHANGE UP (ref 2–14)
NEUTROPHILS # BLD AUTO: 0.6 K/UL — LOW (ref 1.8–7.4)
NEUTROPHILS NFR BLD AUTO: 39 % — LOW (ref 43–77)
NRBC # BLD: SIGNIFICANT CHANGE UP /100 WBCS (ref 0–0)
PCO2 BLDA: 22 MMHG — LOW (ref 32–46)
PH BLDA: <6.813 — SIGNIFICANT CHANGE UP (ref 7.35–7.45)
PHOSPHATE SERPL-MCNC: 8.8 MG/DL — SIGNIFICANT CHANGE UP (ref 2.5–4.5)
PLATELET # BLD AUTO: 31 K/UL — LOW (ref 150–400)
PLATELET # BLD AUTO: 34 K/UL — LOW (ref 150–400)
PLATELET # BLD AUTO: 35 K/UL — LOW (ref 150–400)
PO2 BLDA: 139 MMHG — HIGH (ref 74–108)
POTASSIUM SERPL-MCNC: 7.4 MMOL/L — CRITICAL HIGH (ref 3.5–5.3)
POTASSIUM SERPL-MCNC: 8.4 MMOL/L — CRITICAL HIGH (ref 3.5–5.3)
POTASSIUM SERPL-SCNC: 7.4 MMOL/L — CRITICAL HIGH (ref 3.5–5.3)
POTASSIUM SERPL-SCNC: 8.4 MMOL/L — CRITICAL HIGH (ref 3.5–5.3)
PROT SERPL-MCNC: 3.2 G/DL — LOW (ref 6–8.3)
PROT SERPL-MCNC: 3.2 G/DL — LOW (ref 6–8.3)
RBC # BLD: 2.14 M/UL — LOW (ref 3.8–5.2)
RBC # BLD: 2.22 M/UL — LOW (ref 3.8–5.2)
RBC # BLD: 2.23 M/UL — LOW (ref 3.8–5.2)
RBC # FLD: 20.2 % — HIGH (ref 10.3–14.5)
RBC # FLD: 20.2 % — HIGH (ref 10.3–14.5)
RBC # FLD: 20.3 % — HIGH (ref 10.3–14.5)
SAO2 % BLDA: 95 % — SIGNIFICANT CHANGE UP (ref 92–96)
SODIUM SERPL-SCNC: 139 MMOL/L — SIGNIFICANT CHANGE UP (ref 135–145)
SODIUM SERPL-SCNC: 143 MMOL/L — SIGNIFICANT CHANGE UP (ref 135–145)
VIT B12 SERPL-MCNC: >2000 PG/ML — HIGH (ref 232–1245)
WBC # BLD: 1.2 K/UL — LOW (ref 3.8–10.5)
WBC # BLD: 1.6 K/UL — LOW (ref 3.8–10.5)
WBC # BLD: SIGNIFICANT CHANGE UP K/UL (ref 3.8–10.5)
WBC # FLD AUTO: 1.2 K/UL — LOW (ref 3.8–10.5)
WBC # FLD AUTO: 1.6 K/UL — LOW (ref 3.8–10.5)
WBC # FLD AUTO: SIGNIFICANT CHANGE UP K/UL (ref 3.8–10.5)

## 2018-11-11 RX ORDER — SODIUM CHLORIDE 9 MG/ML
1000 INJECTION INTRAMUSCULAR; INTRAVENOUS; SUBCUTANEOUS ONCE
Qty: 0 | Refills: 0 | Status: COMPLETED | OUTPATIENT
Start: 2018-11-11 | End: 2018-11-11

## 2018-11-11 RX ORDER — CALCIUM GLUCONATE 100 MG/ML
1 VIAL (ML) INTRAVENOUS ONCE
Qty: 0 | Refills: 0 | Status: COMPLETED | OUTPATIENT
Start: 2018-11-11 | End: 2018-11-11

## 2018-11-11 RX ORDER — MORPHINE SULFATE 50 MG/1
2 CAPSULE, EXTENDED RELEASE ORAL
Qty: 0 | Refills: 0 | Status: DISCONTINUED | OUTPATIENT
Start: 2018-11-11 | End: 2018-11-11

## 2018-11-11 RX ORDER — INSULIN HUMAN 100 [IU]/ML
8 INJECTION, SOLUTION SUBCUTANEOUS ONCE
Qty: 0 | Refills: 0 | Status: COMPLETED | OUTPATIENT
Start: 2018-11-11 | End: 2018-11-11

## 2018-11-11 RX ORDER — ACETYLCYSTEINE 200 MG/ML
3.9 VIAL (ML) MISCELLANEOUS ONCE
Qty: 0 | Refills: 0 | Status: DISCONTINUED | OUTPATIENT
Start: 2018-11-11 | End: 2018-11-11

## 2018-11-11 RX ORDER — DEXTROSE 50 % IN WATER 50 %
50 SYRINGE (ML) INTRAVENOUS ONCE
Qty: 0 | Refills: 0 | Status: COMPLETED | OUTPATIENT
Start: 2018-11-11 | End: 2018-11-11

## 2018-11-11 RX ORDER — SODIUM BICARBONATE 1 MEQ/ML
0.15 SYRINGE (ML) INTRAVENOUS
Qty: 75 | Refills: 0 | Status: DISCONTINUED | OUTPATIENT
Start: 2018-11-11 | End: 2018-11-11

## 2018-11-11 RX ORDER — SODIUM BICARBONATE 1 MEQ/ML
150 SYRINGE (ML) INTRAVENOUS ONCE
Qty: 0 | Refills: 0 | Status: COMPLETED | OUTPATIENT
Start: 2018-11-11 | End: 2018-11-11

## 2018-11-11 RX ADMIN — Medication 1.81 MICROGRAM(S)/KG/MIN: at 08:37

## 2018-11-11 RX ADMIN — Medication 150 MILLIEQUIVALENT(S): at 05:52

## 2018-11-11 RX ADMIN — Medication 1 APPLICATION(S): at 05:23

## 2018-11-11 RX ADMIN — CHLORHEXIDINE GLUCONATE 1 APPLICATION(S): 213 SOLUTION TOPICAL at 05:22

## 2018-11-11 RX ADMIN — SODIUM CHLORIDE 1000 MILLILITER(S): 9 INJECTION INTRAMUSCULAR; INTRAVENOUS; SUBCUTANEOUS at 02:30

## 2018-11-11 RX ADMIN — Medication 200 GRAM(S): at 08:38

## 2018-11-11 RX ADMIN — PANTOPRAZOLE SODIUM 40 MILLIGRAM(S): 20 TABLET, DELAYED RELEASE ORAL at 05:22

## 2018-11-11 RX ADMIN — INSULIN HUMAN 8 UNIT(S): 100 INJECTION, SOLUTION SUBCUTANEOUS at 08:34

## 2018-11-11 RX ADMIN — Medication 50 MILLILITER(S): at 08:34

## 2018-11-11 NOTE — PROGRESS NOTE ADULT - PROBLEM SELECTOR PLAN 2
meets sepsis criteria: low WBC, lactate 16, and temp 94.8 on admission  vanc and zosyn  UA neg, CXR neg  f/u bcx  lactate 16-- > 14 --> continue to trend q 2 hours  s/p 3L bolus, continue with maintenance Meets sepsis criteria: low WBC, lactate 16, and temp 94.8 on admission  - C/W Vancomycin and zosyn  - UA neg, CXR neg  - F/U Blood cultures  - Lactate: 16/ 14/ 12/13/15/17.5   - Poor Prognosis Meets sepsis criteria: low WBC, lactate 16, and temp 94.8 on admission  - C/W Vancomycin and Zosyn  - UA neg, CXR neg  - F/U Blood cultures  - Lactate: 16/ 14/ 12/13/15/17.5   - Poor Prognosis

## 2018-11-11 NOTE — PROGRESS NOTE ADULT - PROBLEM SELECTOR PLAN 6
baseline Cr unknown  Cr 2.09 on admission  f/u urine lytes  mata for monitoring output  abdominal sono  likely pre-renal from dehydration Acute kidney failure - r/o ATN due to hypoperfusion  - Cr 2.09 on admission  - f/u urine lytes  mata for monitoring output  abdominal sono  likely pre-renal from dehydration

## 2018-11-11 NOTE — PROGRESS NOTE ADULT - PROBLEM SELECTOR PLAN 3
hx MM Dx April 2018  follows up at [facility] - Dr. Haresh Hutson consulted  transfuse for Hb <7  gets monthly blood transfusions- last Oct 2018  f/u b/l LE dopplers History of MM Dx April 2018  - follows up at [facility] - Dr. Hutson  - Dr. Hutson consulted  - transfuse for Hb <7  - Gets monthly blood transfusions- last Oct 2018  - f/u b/l LE dopplers

## 2018-11-11 NOTE — PROGRESS NOTE ADULT - PROBLEM SELECTOR PLAN 5
baseline LFTS unknown  shock liver  trend CMP with fluids  f/u hepatitis panel  trend lactate and hydrate - Baseline LFTS unknown  - Shock liver S/P Cardiac Arrest

## 2018-11-11 NOTE — CONSULT NOTE ADULT - ASSESSMENT
89 year old lady with myeloma on revlimid and severe anemia on periodic transfusion, has been very weak had cardiac arrest with Hb 6.2 in ER.  Her LFT, CR, PT/PTT are high.  She is hypothermic, on vent, has GIB now.
Myoclonus in patient with unknown duration of cardiac arrest and multi organ failure, the myoclonus can be also due to uremia and liver failure.  Can consider checking ammonia level.  No need for AED.  Supportive care as per primary team.  Myoclonus is sign of poor prognostic factor for meaningful neurological recovery in patients with cardiac arrest.

## 2018-11-11 NOTE — PROGRESS NOTE ADULT - ATTENDING COMMENTS
Patient seen and examined with resident, Addendum to above.    89 y/o female with history of multiple myeloma, HTN, Breast cancer admitted post cardiac arrest. Unwitnessed arrest, unknown downtime. At this time patient with poor prognosis. She had multiorgan dysfunction. Spoke with family at bedside, they were agreeable to no escalation of care. Patient  shortly thereafter.     Assessment:  1. Cardiac arrest  2. Acute respiratory failure   3. Acute kidney failure - r/o ATN due to hypoperfusion  4. Transaminitis - likely shock liver  5. Pancytopenia - history of multiple myeloma   6. Lactic acidosis Patient seen and examined with resident, Addendum to above.    89 y/o female with history of multiple myeloma, HTN, Breast cancer admitted post cardiac arrest. Unwitnessed arrest, unknown downtime. At this time patient with poor prognosis. She had multiorgan dysfunction. Spoke with family at bedside, they were agreeable to no escalation of care. They were ok with continuing current medications including vasopressors, antibiotics and ventilatory support. Their goal is comfort. Patient  shortly thereafter.     Assessment:  1. Cardiac arrest  2. Acute respiratory failure   3. Acute kidney failure - r/o ATN due to hypoperfusion  4. Transaminitis - likely shock liver  5. Pancytopenia - history of multiple myeloma   6. Lactic acidosis

## 2018-11-11 NOTE — PROGRESS NOTE ADULT - ASSESSMENT
Patient is a 89 Female from home, AAOx3 at baseline, ambulates with walker, with PMH HTN, chronic anemia (gets monthly blood transfusions at [facility] with Dr. Hutson- was scheduled for this week, last transfusion Oct 16th), multiple myeloma, Hx breast CA 1 year ago Dx with R mastectomy (now in remission), presenting s/p cardiac arrest at home (time of onset unknown).     As per , patient was unresponsive and then called EMS. ROSC after 20 minutes and was intubated. As per , patient was recently at [facility] 2 weeks ago for chest pain and was discharged with musculoskeletal pain, with cardiac work up negative.     Admitted for S/P Cardiac Arrest needing ventilator support

## 2018-11-11 NOTE — PROGRESS NOTE ADULT - SUBJECTIVE AND OBJECTIVE BOX
INTERVAL HPI/OVERNIGHT EVENTS:   No overnight events    PRESSORS: [ ] YES [ ] NO  WHICH:   Phenylephrine  Levophed  Vasopressin      Antimicrobial:  piperacillin/tazobactam IVPB. 3.375 Gram(s) IV Intermittent every 12 hours  vancomycin  IVPB 750 milliGRAM(s) IV Intermittent every 24 hours    Cardiovascular:  norepinephrine Infusion 0.05 MICROgram(s)/kG/Min IV Continuous <Continuous>    Pulmonary:    Hematalogic:    Other:  acetylcysteine IVPB 3.9 Gram(s) IV Intermittent once  aspirin Suppository 300 milliGRAM(s) Rectal daily  atorvastatin 40 milliGRAM(s) Oral at bedtime  chlorhexidine 4% Liquid 1 Application(s) Topical two times a day  dextrose 5% + sodium chloride 0.9%. 1000 milliLiter(s) IV Continuous <Continuous>  dextrose 5%. 1000 milliLiter(s) IV Continuous <Continuous>  filgrastim-sndz Injectable 300 MICROGram(s) SubCutaneous daily  glucagon  Injectable 1 milliGRAM(s) IntraMuscular once PRN  insulin lispro (HumaLOG) corrective regimen sliding scale   SubCutaneous every 6 hours  levETIRAcetam  IVPB 1000 milliGRAM(s) IV Intermittent every 12 hours  LORazepam   Injectable 1 milliGRAM(s) IV Push every 4 hours PRN  pantoprazole  Injectable 40 milliGRAM(s) IV Push two times a day  petrolatum Ophthalmic Ointment 1 Application(s) Both EYES two times a day  sodium bicarbonate  Infusion 0.146 mEq/kG/Hr IV Continuous <Continuous>      Drug Dosing Weight  Height (cm): 160.02 (10 Nov 2018 07:02)  Weight (kg): 38.5 (10 Nov 2018 09:13)  BMI (kg/m2): 15 (10 Nov 2018 09:13)  BSA (m2): 1.34 (10 Nov 2018 09:13)    CENTRAL LINE: [x ] YES [ ] NO  LOCATION:   [facility]  REMOVE: [ ] YES [ ] NO      BRADLEY: [X ] YES [ ] NO      REMOVE:  [ ] YES [ ] NO      A-LINE:  [ ] YES [ X] NO  LOCATION:     REMOVE:  [ ] YES [ ] NO      PMH/Social Hx/Fam Hx -reviewed admission note, no change since admission  PAST MEDICAL & SURGICAL HISTORY:  Hypertension  Breast cancer  Multiple myeloma  H/O right mastectomy        T(C): 37.4 (18 @ 03:00), Max: 37.4 (18 @ 03:00)  HR: 64 (18 @ 06:30)  BP: 85/49 (18 @ 06:30)  BP(mean): 57 (18 @ 06:30)  ABP: --  ABP(mean): --  RR: 16 (18 @ 06:30)  SpO2: 95% (11-10-18 @ 18:00)  Wt(kg): --    ABG - ( 2018 05:12 )  pH, Arterial: <6.813 pH, Blood: x     /  pCO2: 22    /  pO2: 139   / HCO3: NR    / Base Excess: NR    /  SaO2: 95                        Mode: AC/ CMV (Assist Control/ Continuous Mandatory Ventilation)  RR (machine): 16  TV (machine): 450  FiO2: 40  PEEP: 5  ITime: 1  MAP: 11  PIP: 24      PHYSICAL EXAM:    GENERAL: NAD, Sedated and Intubated  HEAD:  Atraumatic, Normocephalic  EYES: Conjunctiva and sclera clear  ENMT: No tonsillar erythema, exudates, or enlargement; Moist mucous membranes  NECK: Supple, normal appearance, No JVD; Trachea midline  CHEST/LUNG: No chest deformity; Normal percussion bilaterally; No rales, rhonchi, wheezing  HEART: Regular rate and rhythm   ABDOMEN: Soft, Nontender, Nondistended; Bowel sounds present  EXTREMITIES:  No clubbing, cyanosis, or edema      LABS:  CBC Full  -  ( 2018 05:50 )  WBC Count : 1.2 K/uL  Hemoglobin : 6.4 g/dL  Hematocrit : 21.5 %  Platelet Count - Automated : 35 K/uL  Mean Cell Volume : 96.8 fl  Mean Cell Hemoglobin : 29.0 pg  Mean Cell Hemoglobin Concentration : 29.9 gm/dL  Auto Neutrophil # : x  Auto Lymphocyte # : x  Auto Monocyte # : x  Auto Eosinophil # : x  Auto Basophil # : x  Auto Neutrophil % : x  Auto Lymphocyte % : x  Auto Monocyte % : x  Auto Eosinophil % : x  Auto Basophil % : x        139  |  105  |  37<H>  ----------------------------<  453<HH>  7.4<HH>   |  4<LL>  |  1.96<H>    Ca    7.5<L>      2018 04:59  Mg     2.4         TPro  3.2<L>  /  Alb  0.8<L>  /  TBili  1.0  /  DBili  x   /  AST  5334<H>  /  ALT  2595<H>  /  AlkPhos  62      PT/INR - ( 10 Nov 2018 07:30 )   PT: 25.5 sec;   INR: 2.24 ratio         PTT - ( 10 Nov 2018 07:30 )  PTT:56.0 sec  Urinalysis Basic - ( 10 Nov 2018 07:30 )    Color: Yellow / Appearance: Clear / S.010 / pH: x  Gluc: x / Ketone: Negative  / Bili: Negative / Urobili: Negative   Blood: x / Protein: 30 mg/dL / Nitrite: Negative   Leuk Esterase: Small / RBC: 5-10 /HPF / WBC 3-5 /HPF   Sq Epi: x / Non Sq Epi: Few /HPF / Bacteria: Moderate /HPF          RADIOLOGY & ADDITIONAL STUDIES REVIEWED:      GOALS OF CARE DISCUSSION WITH PATIENT/FAMILY/PROXY/ QUESTIONS WERE ANSWERED TO THE BEST OF MY ABILITIES    CRITICAL CARE TIME SPENT: 35 minutes INTERVAL HPI/OVERNIGHT EVENTS:   No overnight events    PRESSORS: [x ] YES [ ] NO  WHICH:   Levophed        Antimicrobial:  piperacillin/tazobactam IVPB. 3.375 Gram(s) IV Intermittent every 12 hours  vancomycin  IVPB 750 milliGRAM(s) IV Intermittent every 24 hours    Cardiovascular:  norepinephrine Infusion 0.05 MICROgram(s)/kG/Min IV Continuous <Continuous>    Pulmonary:    Hematalogic:    Other:  acetylcysteine IVPB 3.9 Gram(s) IV Intermittent once  aspirin Suppository 300 milliGRAM(s) Rectal daily  atorvastatin 40 milliGRAM(s) Oral at bedtime  chlorhexidine 4% Liquid 1 Application(s) Topical two times a day  dextrose 5% + sodium chloride 0.9%. 1000 milliLiter(s) IV Continuous <Continuous>  dextrose 5%. 1000 milliLiter(s) IV Continuous <Continuous>  filgrastim-sndz Injectable 300 MICROGram(s) SubCutaneous daily  glucagon  Injectable 1 milliGRAM(s) IntraMuscular once PRN  insulin lispro (HumaLOG) corrective regimen sliding scale   SubCutaneous every 6 hours  levETIRAcetam  IVPB 1000 milliGRAM(s) IV Intermittent every 12 hours  LORazepam   Injectable 1 milliGRAM(s) IV Push every 4 hours PRN  pantoprazole  Injectable 40 milliGRAM(s) IV Push two times a day  petrolatum Ophthalmic Ointment 1 Application(s) Both EYES two times a day  sodium bicarbonate  Infusion 0.146 mEq/kG/Hr IV Continuous <Continuous>      Drug Dosing Weight  Height (cm): 160.02 (10 Nov 2018 07:02)  Weight (kg): 38.5 (10 Nov 2018 09:13)  BMI (kg/m2): 15 (10 Nov 2018 09:13)  BSA (m2): 1.34 (10 Nov 2018 09:13)    CENTRAL LINE: [x ] YES [ ] NO  LOCATION:   [facility]  REMOVE: [ ] YES [ ] NO      BRADLEY: [X ] YES [ ] NO      REMOVE:  [ ] YES [ ] NO      A-LINE:  [ ] YES [ X] NO  LOCATION:     REMOVE:  [ ] YES [ ] NO      PMH/Social Hx/Fam Hx -reviewed admission note, no change since admission  PAST MEDICAL & SURGICAL HISTORY:  Hypertension  Breast cancer  Multiple myeloma  H/O right mastectomy        T(C): 37.4 (18 @ 03:00), Max: 37.4 (18 @ 03:00)  HR: 64 (18 @ 06:30)  BP: 85/49 (18 @ 06:30)  BP(mean): 57 (18 @ 06:30)  ABP: --  ABP(mean): --  RR: 16 (18 @ 06:30)  SpO2: 95% (11-10-18 @ 18:00)  Wt(kg): --    ABG - ( 2018 05:12 )  pH, Arterial: <6.813 pH, Blood: x     /  pCO2: 22    /  pO2: 139   / HCO3: NR    / Base Excess: NR    /  SaO2: 95                        Mode: AC/ CMV (Assist Control/ Continuous Mandatory Ventilation)  RR (machine): 16  TV (machine): 450  FiO2: 40  PEEP: 5  ITime: 1  MAP: 11  PIP: 24      PHYSICAL EXAM:    GENERAL: NAD, Sedated and Intubated  HEAD:  Atraumatic, Normocephalic  EYES: Conjunctiva and sclera clear  ENMT: No tonsillar erythema, exudates, or enlargement; Moist mucous membranes  NECK: Supple, normal appearance, No JVD; Trachea midline  CHEST/LUNG: No chest deformity; Normal percussion bilaterally; No rales, rhonchi, wheezing  HEART: Regular rate and rhythm   ABDOMEN: Soft, Nontender, Nondistended; Bowel sounds present  EXTREMITIES:  No clubbing, cyanosis, or edema      LABS:  CBC Full  -  ( 2018 05:50 )  WBC Count : 1.2 K/uL  Hemoglobin : 6.4 g/dL  Hematocrit : 21.5 %  Platelet Count - Automated : 35 K/uL  Mean Cell Volume : 96.8 fl  Mean Cell Hemoglobin : 29.0 pg  Mean Cell Hemoglobin Concentration : 29.9 gm/dL  Auto Neutrophil # : x  Auto Lymphocyte # : x  Auto Monocyte # : x  Auto Eosinophil # : x  Auto Basophil # : x  Auto Neutrophil % : x  Auto Lymphocyte % : x  Auto Monocyte % : x  Auto Eosinophil % : x  Auto Basophil % : x        139  |  105  |  37<H>  ----------------------------<  453<HH>  7.4<HH>   |  4<LL>  |  1.96<H>    Ca    7.5<L>      2018 04:59  Mg     2.4         TPro  3.2<L>  /  Alb  0.8<L>  /  TBili  1.0  /  DBili  x   /  AST  5334<H>  /  ALT  2595<H>  /  AlkPhos  62      PT/INR - ( 10 Nov 2018 07:30 )   PT: 25.5 sec;   INR: 2.24 ratio         PTT - ( 10 Nov 2018 07:30 )  PTT:56.0 sec  Urinalysis Basic - ( 10 Nov 2018 07:30 )    Color: Yellow / Appearance: Clear / S.010 / pH: x  Gluc: x / Ketone: Negative  / Bili: Negative / Urobili: Negative   Blood: x / Protein: 30 mg/dL / Nitrite: Negative   Leuk Esterase: Small / RBC: 5-10 /HPF / WBC 3-5 /HPF   Sq Epi: x / Non Sq Epi: Few /HPF / Bacteria: Moderate /HPF          RADIOLOGY & ADDITIONAL STUDIES REVIEWED:      GOALS OF CARE DISCUSSION WITH PATIENT/FAMILY/PROXY/ QUESTIONS WERE ANSWERED TO THE BEST OF MY ABILITIES    CRITICAL CARE TIME SPENT: 35 minutes

## 2018-11-11 NOTE — PROGRESS NOTE ADULT - PROBLEM SELECTOR PLAN 10
- DNR  - Long discussion with the family regarding current condition.  Very poor prognosis - DNR  - Long discussion with the family regarding current condition. Poor prognosis

## 2018-11-11 NOTE — PROGRESS NOTE ADULT - PROBLEM SELECTOR PLAN 1
S/P Cardiac Arrest at home ~ ROSC in 20 min to afib with slow VR  as per  and daughter, had neg ischemic workup at [facility] 2 weeks ago  s/p 4 epi, 1 bicarb, 1 calcium gluconate in EMS  in the ED, went bradycardic to 30s and had atropine 0.5mg x 1 with improvement of HR  EKG: afib with ST depressions in V4-v6  rectal aspirin, lipitor  holding beta blocker 2/2 bradycardia in ED  echocardiogram  not a candidate for TTM as unwitnessed arrest, hypothermic in ED to 96 deg F  hemodynamically stable off pressors  Dr. Fermin- cardio  trop 1: 0.139 --> continue to trend  likely 2/2 demand ischemia and anemia S/P Cardiac Arrest at home ~ ROSC in 20 min to afib with slow VR  as per  and daughter, had neg ischemic workup at [facility] 2 weeks ago.  - C/W Pressors Levophed  - Elevated troponin: Likely Demand Ischemia S/P Cardiac Arrest   - Dr. Fermin- cardio  likely 2/2 demand ischemia and anemia S/P Cardiac Arrest at home ~ ROSC in 20 min to afib with slow VR  as per  and daughter, had neg ischemic workup at [facility] 2 weeks ago.  - C/W Pressors: Levophed  - Elevated troponin: Likely Demand Ischemia due to anemia S/P Cardiac Arrest   - Dr. Fermin- cardio.   - EEG: Showing Myoclonus. Sign of poor prognostic factor for meaningful neurological recovery in patients with cardiac arrest.   - Dr. Olivas Cardio S/P Cardiac Arrest at home ~ ROSC in 20 min to afib with slow VR  as per  and daughter, had neg ischemic workup at [facility] 2 weeks ago.  - C/W Pressors: Levophed  - Elevated troponin: Likely Demand Ischemia due to anemia S/P Cardiac Arrest   - Dr. Fermin- cardio.   - EEG: Showing Myoclonus. Sign of poor prognostic factor for meaningful neurological recovery in patients with cardiac arrest. C/W Keppra for Seziure prophylaxis.   - Dr. Olivas Neuro

## 2018-11-11 NOTE — CONSULT NOTE ADULT - SUBJECTIVE AND OBJECTIVE BOX
Patient is a 89y old  Female who presents with a chief complaint of cardiac arrest (2018 06:43)      HPI:  PULMONARY/CRITICAL CARE CONSULTATION  JYOTI ADAMS 89y FemalePatient is a 89y old  Female who presents with a chief complaint of s/p cardiac arrest.    HPI:   Patient is a 89F from home, AAOx3 at baseline, ambulates with walker, with PMH HTN, chronic anemia (gets monthly blood transfusions at [facility] with Dr. Hutson- was scheduled for this week, last transfusion Oct 16th), multiple myeloma on revlimid, Hx breast CA 1 year ago Dx with R mastectomy (now in remission), presenting s/p cardiac arrest at home (time of onset unknown). As per  at bedside, patient was reporting not feeling well last night and at 3am was found on the floor on the 1st floor of the house and was moved to a sofa at that time.  later heard patient groaning at 6am, went to check on the patient, who was unresponsive and then called EMS. EMS gave 4 rounds of CPR with 4 epi, 1 amp d50, 1 am bicarb) and patient was intubated. As per , patient was recently at [facility] 2 weeks ago for chest pain and was discharged with musculoskeletal pain, with cardiac work up negative. Denies hx CHF or cardiac disease.     In the ED, patient was bradycardic to the 30s and was given atropine x1. Hypothermic to Temp 94, not on TTM due to unwitnessed cardiac arrest. Meets sepsis criteria, s/p 3L bolus, 1 dose vanc and zosyn ordered. Lactate 16. WBC went down tom0.5, Platelet went down to 30, Hb went up to 11 after trnasfusion then went down to 6 with GIB.  PT/PTT, LFT, and Cr are elevated.  She is on norepinephrine drip.    Spoke to patient's daughter, Jackie Penny, who is also HCP with patient's , for further history. She would like more time for goals of care decision and wants full code at this time, as [place].  at bedside agreeable to blood transfusion and central line placement if needed, but would like to defer goals of care to patient's daughter.      PAST MEDICAL & SURGICAL HISTORY:  Hypertension  Breast cancer  Multiple myeloma  H/O right mastectomy    FAMILY HISTORY:  No pertinent family history in first degree relatives    Allergies  No Known Allergies    SOCIAL HISTORY/FAMILY HISTORY reviewed:   Medications:  aspirin Suppository 300 milliGRAM(s) Rectal daily  atorvastatin 40 milliGRAM(s) Oral at bedtime  atropine Injectable 0.5 milliGRAM(s) IV Push once PRN  calcium gluconate IVPB 1 Gram(s) IV Intermittent Once  piperacillin/tazobactam IVPB. 3.375 Gram(s) IV Intermittent once  sodium bicarbonate  Injectable 150 milliEquivalent(s) IV Push Once  sodium chloride 0.9% Bolus 1000 milliLiter(s) IV Bolus once  sodium chloride 0.9%. 1000 milliLiter(s) IV Continuous <Continuous>  vancomycin  IVPB 1000 milliGRAM(s) IV Intermittent once      Review of Systems: unobtainable 2/2 mental status, and intubation    vent settings if applicable:  Mode: AC/ CMV (Assist Control/ Continuous Mandatory Ventilation)  RR (machine): 16  TV (machine): 450  FiO2: 100  PEEP: 5  MAP: 10  PIP: 26      T(F): 94.8 (11-10-18 @ 06:54), Max: 94.8 (11-10-18 @ 06:54)  HR: 64 (11-10-18 @ 07:54)  BP: 117/41 (11-10-18 @ 07:54)  BP(mean): --  ABP: --  RR: 15 (11-10-18 @ 07:54)  SpO2: 100% (11-10-18 @ 07:54)      LABS:                        6.2    3.0   )-----------( x        ( 10 Nov 2018 07:30 )             20.6     -10    134<L>  |  99  |  44<H>  ----------------------------<  204<H>  5.6<H>   |  12<L>  |  2.09<H>    Ca    7.1<L>      10 Nov 2018 07:30    TPro  5.6<L>  /  Alb  1.5<L>  /  TBili  0.7  /  DBili  x   /  AST  725<H>  /  ALT  446<H>  /  AlkPhos  107  -10    CARDIAC MARKERS ( 10 Nov 2018 07:30 )  0.139 ng/mL / x     / x     / x     / 2.1 ng/mL    POCT Blood Glucose.: 73 mg/dL (10 Nov 2018 06:53)    PT/INR - ( 10 Nov 2018 07:30 )   PT: 25.5 sec;   INR: 2.24 ratio      PTT - ( 10 Nov 2018 07:30 )  PTT:56.0 sec  Urinalysis Basic - ( 10 Nov 2018 07:30 )    Color: Yellow / Appearance: Clear / S.010 / pH: x  Gluc: x / Ketone: Negative  / Bili: Negative / Urobili: Negative   Blood: x / Protein: 30 mg/dL / Nitrite: Negative   Leuk Esterase: Small / RBC: 5-10 /HPF / WBC 3-5 /HPF   Sq Epi: x / Non Sq Epi: Few /HPF / Bacteria: Moderate /HPF    PHYSICAL EXAM:  GENERAL:  [x ] thin, cachectic female, generalized weeping and ecchymotic lesions  HEAD:  [ x]Atraumatic, [x ]Normocephalic;  EYES: R eye dilated, L eye pinpoint  ENMT: [x ]No tonsillar erythema, exudates, or enlargement; [x ] Dry mucous membranes  NECK: [x ]Supple, normal appearance, [x ]No JVD; [x ]Normal thyroid; [x ]Trachea midline;  NERVOUS SYSTEM:  spontaneously moves extremities, unarousable to painful stimuli  CHEST/LUNG: CTAB  HEART: [ ]irregular rate and rhythm; [x ]No murmurs, rubs, or gallops;  ABDOMEN: [ x]Soft, Nontender, Nondistended; [x ]Bowel sounds present;  EXTREMITIES:  [x ]2+ Peripheral Pulses, [x ]No clubbing, cyanosis, or edema  SKIN: diffuse weeping and ecchymotic lesions    RADIOLOGY REVIEWED:    EKG: ST depressions V4-V6, afib (10 Nov 2018 08:44)       ROS:  Negative except for:    PAST MEDICAL & SURGICAL HISTORY:  Hypertension  Breast cancer  Multiple myeloma  H/O right mastectomy      SOCIAL HISTORY:    FAMILY HISTORY:  No pertinent family history in first degree relatives      MEDICATIONS  (STANDING):  acetylcysteine IVPB 3.9 Gram(s) IV Intermittent once  aspirin Suppository 300 milliGRAM(s) Rectal daily  atorvastatin 40 milliGRAM(s) Oral at bedtime  chlorhexidine 4% Liquid 1 Application(s) Topical two times a day  dextrose 5% + sodium chloride 0.9%. 1000 milliLiter(s) (75 mL/Hr) IV Continuous <Continuous>  dextrose 5%. 1000 milliLiter(s) (50 mL/Hr) IV Continuous <Continuous>  filgrastim-sndz Injectable 300 MICROGram(s) SubCutaneous daily  insulin lispro (HumaLOG) corrective regimen sliding scale   SubCutaneous every 6 hours  levETIRAcetam  IVPB 1000 milliGRAM(s) IV Intermittent every 12 hours  morphine  - Injectable 2 milliGRAM(s) IV Push every 2 hours  norepinephrine Infusion 0.05 MICROgram(s)/kG/Min (1.809 mL/Hr) IV Continuous <Continuous>  pantoprazole  Injectable 40 milliGRAM(s) IV Push two times a day  petrolatum Ophthalmic Ointment 1 Application(s) Both EYES two times a day  piperacillin/tazobactam IVPB. 3.375 Gram(s) IV Intermittent every 12 hours  sodium bicarbonate  Infusion 0.146 mEq/kG/Hr (75 mL/Hr) IV Continuous <Continuous>  vancomycin  IVPB 750 milliGRAM(s) IV Intermittent every 24 hours    MEDICATIONS  (PRN):  glucagon  Injectable 1 milliGRAM(s) IntraMuscular once PRN Glucose LESS THAN 70 milligrams/deciliter  LORazepam   Injectable 1 milliGRAM(s) IV Push every 4 hours PRN seizure      Allergies    No Known Allergies    Intolerances        Vital Signs Last 24 Hrs  T(C): 37.2 (2018 06:30), Max: 37.4 (2018 03:00)  T(F): 99 (2018 06:30), Max: 99.3 (2018 03:00)  HR: 70 (2018 09:05) (41 - 93)  BP: 70/40 (2018 09:00) (52/33 - 127/55)  BP(mean): 48 (2018 09:00) (37 - 76)  RR: 16 (2018 09:00) (15 - 34)  SpO2: 69% (2018 09:00) (69% - 100%)    PHYSICAL EXAM  General: adult in NAD  HEENT: clear oropharynx, anicteric sclera, pink conjunctiva  Neck: supple  CV: normal S1/S2 with no murmur rubs or gallops  Lungs: positive air movement b/l ant lungs,clear to auscultation, no wheezes, no rales  Abdomen: soft non-tender non-distended, no hepatosplenomegaly  Ext: no clubbing cyanosis or edema  Skin: no rashes and no petechiae  Neuro: alert and oriented X 4, no focal deficits      LABS:                          6.4    est 1.4 )-----------( 31       ( 2018 07:54 )             20.3         Mean Cell Volume : 95.2 fl  Mean Cell Hemoglobin : 30.1 pg  Mean Cell Hemoglobin Concentration : 31.6 gm/dL  Auto Neutrophil # : x  Auto Lymphocyte # : x  Auto Monocyte # : x  Auto Eosinophil # : x  Auto Basophil # : x  Auto Neutrophil % : x  Auto Lymphocyte % : x  Auto Monocyte % : x  Auto Eosinophil % : x  Auto Basophil % : x      Serial CBC's   @ 07:54  Hct-20.3 / Hgb-6.4 / Plat-31 / RBC-2.14 / WBC-est 1.4  Serial CBC's   @ 06:33  Hct-21.3 / Hgb-6.6 / Plat-34 / RBC-2.23 / WBC-1.6  Serial CBC's   @ 05:50  Hct-21.5 / Hgb-6.4 / Plat-35 / RBC-2.22 / WBC-1.2  Serial CBC's  11-10 @ 19:46  Hct-33.0 / Hgb-11.7 / Plat-96 / RBC-3.76 / WBC-0.6  Serial CBC's  11-10 @ 15:01  Hct-28.8 / Hgb-10.1 / Plat-104 / RBC-3.31 / WBC-0.5  Serial CBC's  11-10 @ 07:30  Hct-20.6 / Hgb-6.2 / Plat-63 / RBC-2.00 / WBC-3.0          143  |  102  |  37<H>  ----------------------------<  347<H>  8.4<HH>   |  8<LL>  |  2.08<H>    Ca    7.6<L>      2018 06:33  Phos  8.8       Mg     2.4         TPro  3.2<L>  /  Alb  0.7<L>  /  TBili  1.0  /  DBili  x   /  AST  6502  /  ALT  2902<H>  /  AlkPhos  72  11-      PT/INR - ( 10 Nov 2018 07:30 )   PT: 25.5 sec;   INR: 2.24 ratio         PTT - ( 10 Nov 2018 07:30 )  PTT:56.0 sec    Vitamin B12, Serum: >2000 pg/mL (11-10 @ 22:21)  Folate, Serum: >20.0 ng/mL (11-10 @ 22:21)              BLOOD SMEAR INTERPRETATION< from: Xray Chest 1 View- PORTABLE-Urgent (11.10.18 @ 14:51) >  INTERPRETATION:  History: Central line placement. Intubated    Technique:  AP portable    Comparisons:  Chest x-ray dated 11/10/2018    Findings:Right jugular CVP line in the SVC. No evidence of a   pneumothorax. Diffuse interstitial densities are present in both lungs   unchanged. No change in reticulonodular infiltrates. No pleural   effusions.     ET tube is in good position at the level of the aortic arch. Nasogastric   tube is coiled in the stomach.  .    The pulmonary vasculature and aorta   are normal for age. Heart size is unremarkable.     The thorax is normal for age.    Impression: Bilateral diffuse reticulonodular densities and infiltrates   unchanged.        < end of copied text >  :       RADIOLOGY & ADDITIONAL STUDIES:

## 2018-11-11 NOTE — DISCHARGE NOTE FOR THE EXPIRED PATIENT - HOSPITAL COURSE
Patient was a 89 F from home, AAOx3 at baseline, ambulates with walker, with PMH HTN, Multiple Myeloma, Chronic Anemia (monthly blood transfusions at [facility] with Dr. Hutson), and Hx Breast CA 1 year ago Dx with R mastectomy (reportedly in remission) p/w cardiac arrest at home w/ time of onset unknown - As per  patient was unresponsive and then called EMS. ROSC achieved after 20 minutes and was intubated. As per , patient was recently at [facility] 2 weeks ago for chest pain and was discharged with w/ Dx of musculoskeletal pain along w/ with cardiac work up negative. Patient admitted to the ICU for post cardiac arrest monitoring - EKG showed Afib with slow VR dependent on maximum dose Levophed - lab work showed multiorgan failure, severe lactic acidosis w/ pH < 7 resistant to HCO3 pushes and drips, EEG showing myoclonus and begun on Keppra for Seizure prophylaxis. Patient met SIRS criteria on admission and started on Vancomycin and Zosyn - UA and CXR neg but persistent Lactate elevation w/ trend as shown 16/ 14/ 12/13/15/17.5. The medical team met with the family at bedside and discussed the poor prognosis and no clinical improvement despite aggressive measures. Informed by RN for no responsiveness. Patient seen and examined at bedside - no lung or cardiac sounds heard on auscultation, pupils nonreactive to light, and no vestibuloocular reflexes on examination. Patient pronounced at 9:30AM and family was notified. Family is not requesting an autopsy. Attending made aware.

## 2018-11-11 NOTE — PROGRESS NOTE ADULT - PROBLEM SELECTOR PLAN 7
holding anti-hypertensives due to borderline BP  consent for central line in chart, not need for pressors at this time  monitor BP Currently on Vasopressor  - Hold BP medications due to Shock state

## 2018-11-11 NOTE — PROGRESS NOTE ADULT - PROBLEM SELECTOR PLAN 4
Hx anemia, baseline Hb unknown  transfusing 2 PRBC and 1 plt  follow up repeat CBC  CBC q 8 hours- next 2pm and 8pm  sump tube for stomach distention, drained ~ 50cc dark brown vomitus likely 2/2 intubation  will place on protonix 40mg IV BID Hx anemia, baseline Hb unknown  - transfusing 2 PRBC and 1 plt  - Follow up repeat CBC  CBC q 8 hours- next 2pm and 8pm  sump tube for stomach distention, drained ~ 50cc dark brown vomitus likely 2/2   will place on protonix 40mg IV BID Hx anemia, baseline Hb unknown  - Transfused 2 PRBC and 1 plt  - Hb 6.4/6.6  - Follow up repeat CBC  - Protonix 40mg IV BID

## 2018-11-11 NOTE — CONSULT NOTE ADULT - PROBLEM SELECTOR RECOMMENDATION 9
hypothermia, leukopenia and thrombocytopenia, suggests sepsis.  The cxr showed increased infiltrate at RLL.  She has difficulty swallowing food at home.  probably aspiration caused sepsis and cardiac arrest.

## 2018-11-12 LAB
-  AMIKACIN: SIGNIFICANT CHANGE UP
-  AMOXICILLIN/CLAVULANIC ACID: SIGNIFICANT CHANGE UP
-  AMPICILLIN/SULBACTAM: SIGNIFICANT CHANGE UP
-  AMPICILLIN: SIGNIFICANT CHANGE UP
-  AZTREONAM: SIGNIFICANT CHANGE UP
-  CANDIDA ALBICANS: SIGNIFICANT CHANGE UP
-  CANDIDA GLABRATA: SIGNIFICANT CHANGE UP
-  CANDIDA KRUSEI: SIGNIFICANT CHANGE UP
-  CANDIDA PARAPSILOSIS: SIGNIFICANT CHANGE UP
-  CANDIDA TROPICALIS: SIGNIFICANT CHANGE UP
-  CEFAZOLIN: SIGNIFICANT CHANGE UP
-  CEFEPIME: SIGNIFICANT CHANGE UP
-  CEFOXITIN: SIGNIFICANT CHANGE UP
-  CEFTRIAXONE: SIGNIFICANT CHANGE UP
-  CIPROFLOXACIN: SIGNIFICANT CHANGE UP
-  COAGULASE NEGATIVE STAPHYLOCOCCUS: SIGNIFICANT CHANGE UP
-  ERTAPENEM: SIGNIFICANT CHANGE UP
-  GENTAMICIN: SIGNIFICANT CHANGE UP
-  IMIPENEM: SIGNIFICANT CHANGE UP
-  K. PNEUMONIAE GROUP: SIGNIFICANT CHANGE UP
-  KPC RESISTANCE GENE: SIGNIFICANT CHANGE UP
-  LEVOFLOXACIN: SIGNIFICANT CHANGE UP
-  MEROPENEM: SIGNIFICANT CHANGE UP
-  NITROFURANTOIN: SIGNIFICANT CHANGE UP
-  PIPERACILLIN/TAZOBACTAM: SIGNIFICANT CHANGE UP
-  STREPTOCOCCUS SP. (NOT GRP A, B OR S PNEUMONIAE): SIGNIFICANT CHANGE UP
-  TIGECYCLINE: SIGNIFICANT CHANGE UP
-  TOBRAMYCIN: SIGNIFICANT CHANGE UP
-  TRIMETHOPRIM/SULFAMETHOXAZOLE: SIGNIFICANT CHANGE UP
A BAUMANNII DNA SPEC QL NAA+PROBE: SIGNIFICANT CHANGE UP
CULTURE RESULTS: SIGNIFICANT CHANGE UP
E CLOAC COMP DNA BLD POS QL NAA+PROBE: SIGNIFICANT CHANGE UP
E COLI DNA BLD POS QL NAA+NON-PROBE: SIGNIFICANT CHANGE UP
ENTEROCOC DNA BLD POS QL NAA+NON-PROBE: SIGNIFICANT CHANGE UP
ENTEROCOC DNA BLD POS QL NAA+NON-PROBE: SIGNIFICANT CHANGE UP
GP B STREP DNA BLD POS QL NAA+NON-PROBE: SIGNIFICANT CHANGE UP
GRAM STN FLD: SIGNIFICANT CHANGE UP
GRAM STN FLD: SIGNIFICANT CHANGE UP
HAEM INFLU DNA BLD POS QL NAA+NON-PROBE: SIGNIFICANT CHANGE UP
K OXYTOCA DNA BLD POS QL NAA+NON-PROBE: SIGNIFICANT CHANGE UP
L MONOCYTOG DNA BLD POS QL NAA+NON-PROBE: SIGNIFICANT CHANGE UP
METHOD TYPE: SIGNIFICANT CHANGE UP
METHOD TYPE: SIGNIFICANT CHANGE UP
MRSA SPEC QL CULT: SIGNIFICANT CHANGE UP
MSSA DNA SPEC QL NAA+PROBE: SIGNIFICANT CHANGE UP
N MEN ISLT CULT: SIGNIFICANT CHANGE UP
ORGANISM # SPEC MICROSCOPIC CNT: SIGNIFICANT CHANGE UP
ORGANISM # SPEC MICROSCOPIC CNT: SIGNIFICANT CHANGE UP
P AERUGINOSA DNA BLD POS NAA+NON-PROBE: SIGNIFICANT CHANGE UP
S MARCESCENS DNA BLD POS NAA+NON-PROBE: SIGNIFICANT CHANGE UP
S PNEUM DNA BLD POS QL NAA+NON-PROBE: SIGNIFICANT CHANGE UP
S PYO DNA BLD POS QL NAA+NON-PROBE: SIGNIFICANT CHANGE UP
SPECIMEN SOURCE: SIGNIFICANT CHANGE UP

## 2018-11-13 LAB
CULTURE RESULTS: SIGNIFICANT CHANGE UP
ORGANISM # SPEC MICROSCOPIC CNT: SIGNIFICANT CHANGE UP
ORGANISM # SPEC MICROSCOPIC CNT: SIGNIFICANT CHANGE UP
SPECIMEN SOURCE: SIGNIFICANT CHANGE UP

## 2018-11-15 LAB
CULTURE RESULTS: SIGNIFICANT CHANGE UP
SPECIMEN SOURCE: SIGNIFICANT CHANGE UP

## 2018-11-27 PROCEDURE — 84443 ASSAY THYROID STIM HORMONE: CPT

## 2018-11-27 PROCEDURE — 82746 ASSAY OF FOLIC ACID SERUM: CPT

## 2018-11-27 PROCEDURE — 80061 LIPID PANEL: CPT

## 2018-11-27 PROCEDURE — 85730 THROMBOPLASTIN TIME PARTIAL: CPT

## 2018-11-27 PROCEDURE — 86901 BLOOD TYPING SEROLOGIC RH(D): CPT

## 2018-11-27 PROCEDURE — 87040 BLOOD CULTURE FOR BACTERIA: CPT

## 2018-11-27 PROCEDURE — P9040: CPT

## 2018-11-27 PROCEDURE — 93005 ELECTROCARDIOGRAM TRACING: CPT

## 2018-11-27 PROCEDURE — 81001 URINALYSIS AUTO W/SCOPE: CPT

## 2018-11-27 PROCEDURE — 94002 VENT MGMT INPAT INIT DAY: CPT

## 2018-11-27 PROCEDURE — 70450 CT HEAD/BRAIN W/O DYE: CPT

## 2018-11-27 PROCEDURE — 95957 EEG DIGITAL ANALYSIS: CPT

## 2018-11-27 PROCEDURE — 71045 X-RAY EXAM CHEST 1 VIEW: CPT

## 2018-11-27 PROCEDURE — 87086 URINE CULTURE/COLONY COUNT: CPT

## 2018-11-27 PROCEDURE — 36415 COLL VENOUS BLD VENIPUNCTURE: CPT

## 2018-11-27 PROCEDURE — 94003 VENT MGMT INPAT SUBQ DAY: CPT

## 2018-11-27 PROCEDURE — 93306 TTE W/DOPPLER COMPLETE: CPT

## 2018-11-27 PROCEDURE — 87186 SC STD MICRODIL/AGAR DIL: CPT

## 2018-11-27 PROCEDURE — 83690 ASSAY OF LIPASE: CPT

## 2018-11-27 PROCEDURE — 84484 ASSAY OF TROPONIN QUANT: CPT

## 2018-11-27 PROCEDURE — 95819 EEG AWAKE AND ASLEEP: CPT

## 2018-11-27 PROCEDURE — 82306 VITAMIN D 25 HYDROXY: CPT

## 2018-11-27 PROCEDURE — 83036 HEMOGLOBIN GLYCOSYLATED A1C: CPT

## 2018-11-27 PROCEDURE — 84156 ASSAY OF PROTEIN URINE: CPT

## 2018-11-27 PROCEDURE — 82436 ASSAY OF URINE CHLORIDE: CPT

## 2018-11-27 PROCEDURE — 82570 ASSAY OF URINE CREATININE: CPT

## 2018-11-27 PROCEDURE — 80074 ACUTE HEPATITIS PANEL: CPT

## 2018-11-27 PROCEDURE — 83735 ASSAY OF MAGNESIUM: CPT

## 2018-11-27 PROCEDURE — P9037: CPT

## 2018-11-27 PROCEDURE — 36430 TRANSFUSION BLD/BLD COMPNT: CPT

## 2018-11-27 PROCEDURE — 84300 ASSAY OF URINE SODIUM: CPT

## 2018-11-27 PROCEDURE — 86900 BLOOD TYPING SEROLOGIC ABO: CPT

## 2018-11-27 PROCEDURE — 85610 PROTHROMBIN TIME: CPT

## 2018-11-27 PROCEDURE — 86850 RBC ANTIBODY SCREEN: CPT

## 2018-11-27 PROCEDURE — 82607 VITAMIN B-12: CPT

## 2018-11-27 PROCEDURE — 83605 ASSAY OF LACTIC ACID: CPT

## 2018-11-27 PROCEDURE — 82803 BLOOD GASES ANY COMBINATION: CPT

## 2018-11-27 PROCEDURE — 74018 RADEX ABDOMEN 1 VIEW: CPT

## 2018-11-27 PROCEDURE — 82550 ASSAY OF CK (CPK): CPT

## 2018-11-27 PROCEDURE — 84100 ASSAY OF PHOSPHORUS: CPT

## 2018-11-27 PROCEDURE — 83880 ASSAY OF NATRIURETIC PEPTIDE: CPT

## 2018-11-27 PROCEDURE — 86923 COMPATIBILITY TEST ELECTRIC: CPT

## 2018-11-27 PROCEDURE — 80053 COMPREHEN METABOLIC PANEL: CPT

## 2018-11-27 PROCEDURE — 85027 COMPLETE CBC AUTOMATED: CPT

## 2018-11-27 PROCEDURE — 82962 GLUCOSE BLOOD TEST: CPT

## 2018-11-27 PROCEDURE — 93970 EXTREMITY STUDY: CPT

## 2018-11-27 PROCEDURE — 82553 CREATINE MB FRACTION: CPT

## 2018-11-27 PROCEDURE — 87150 DNA/RNA AMPLIFIED PROBE: CPT

## 2018-11-27 PROCEDURE — 99291 CRITICAL CARE FIRST HOUR: CPT | Mod: 25
